# Patient Record
Sex: FEMALE | Race: WHITE | NOT HISPANIC OR LATINO | Employment: PART TIME | URBAN - METROPOLITAN AREA
[De-identification: names, ages, dates, MRNs, and addresses within clinical notes are randomized per-mention and may not be internally consistent; named-entity substitution may affect disease eponyms.]

---

## 2019-06-14 ENCOUNTER — APPOINTMENT (OUTPATIENT)
Dept: WOMENS IMAGING | Facility: HOSPITAL | Age: 54
End: 2019-06-14

## 2019-06-14 PROCEDURE — 77063 BREAST TOMOSYNTHESIS BI: CPT | Performed by: RADIOLOGY

## 2019-06-14 PROCEDURE — 77067 SCR MAMMO BI INCL CAD: CPT | Performed by: RADIOLOGY

## 2020-01-10 ENCOUNTER — APPOINTMENT (OUTPATIENT)
Dept: PREADMISSION TESTING | Facility: HOSPITAL | Age: 55
End: 2020-01-10

## 2020-01-10 VITALS
WEIGHT: 193 LBS | BODY MASS INDEX: 27.63 KG/M2 | OXYGEN SATURATION: 98 % | HEIGHT: 70 IN | RESPIRATION RATE: 16 BRPM | SYSTOLIC BLOOD PRESSURE: 115 MMHG | HEART RATE: 72 BPM | DIASTOLIC BLOOD PRESSURE: 77 MMHG | TEMPERATURE: 98.6 F

## 2020-01-10 LAB
ABO GROUP BLD: NORMAL
BASOPHILS # BLD AUTO: 0.06 10*3/MM3 (ref 0–0.2)
BASOPHILS NFR BLD AUTO: 0.6 % (ref 0–1.5)
BLD GP AB SCN SERPL QL: NEGATIVE
DEPRECATED RDW RBC AUTO: 40.5 FL (ref 37–54)
EOSINOPHIL # BLD AUTO: 0.19 10*3/MM3 (ref 0–0.4)
EOSINOPHIL NFR BLD AUTO: 2 % (ref 0.3–6.2)
ERYTHROCYTE [DISTWIDTH] IN BLOOD BY AUTOMATED COUNT: 12.7 % (ref 12.3–15.4)
HCT VFR BLD AUTO: 40.8 % (ref 34–46.6)
HGB BLD-MCNC: 13.5 G/DL (ref 12–15.9)
IMM GRANULOCYTES # BLD AUTO: 0.02 10*3/MM3 (ref 0–0.05)
IMM GRANULOCYTES NFR BLD AUTO: 0.2 % (ref 0–0.5)
LYMPHOCYTES # BLD AUTO: 2.59 10*3/MM3 (ref 0.7–3.1)
LYMPHOCYTES NFR BLD AUTO: 27 % (ref 19.6–45.3)
MCH RBC QN AUTO: 29.3 PG (ref 26.6–33)
MCHC RBC AUTO-ENTMCNC: 33.1 G/DL (ref 31.5–35.7)
MCV RBC AUTO: 88.5 FL (ref 79–97)
MONOCYTES # BLD AUTO: 0.65 10*3/MM3 (ref 0.1–0.9)
MONOCYTES NFR BLD AUTO: 6.8 % (ref 5–12)
NEUTROPHILS # BLD AUTO: 6.07 10*3/MM3 (ref 1.7–7)
NEUTROPHILS NFR BLD AUTO: 63.4 % (ref 42.7–76)
NRBC BLD AUTO-RTO: 0 /100 WBC (ref 0–0.2)
PLATELET # BLD AUTO: 279 10*3/MM3 (ref 140–450)
PMV BLD AUTO: 8.8 FL (ref 6–12)
RBC # BLD AUTO: 4.61 10*6/MM3 (ref 3.77–5.28)
RH BLD: POSITIVE
T&S EXPIRATION DATE: NORMAL
WBC NRBC COR # BLD: 9.58 10*3/MM3 (ref 3.4–10.8)

## 2020-01-10 PROCEDURE — 86901 BLOOD TYPING SEROLOGIC RH(D): CPT | Performed by: OBSTETRICS & GYNECOLOGY

## 2020-01-10 PROCEDURE — 86900 BLOOD TYPING SEROLOGIC ABO: CPT | Performed by: OBSTETRICS & GYNECOLOGY

## 2020-01-10 PROCEDURE — 36415 COLL VENOUS BLD VENIPUNCTURE: CPT

## 2020-01-10 PROCEDURE — 86850 RBC ANTIBODY SCREEN: CPT | Performed by: OBSTETRICS & GYNECOLOGY

## 2020-01-10 PROCEDURE — 85025 COMPLETE CBC W/AUTO DIFF WBC: CPT | Performed by: OBSTETRICS & GYNECOLOGY

## 2020-01-10 RX ORDER — CHOLECALCIFEROL (VITAMIN D3) 50 MCG
2000 TABLET ORAL DAILY
COMMUNITY

## 2020-01-10 RX ORDER — IVERMECTIN 10 MG/G
1 CREAM TOPICAL AS NEEDED
COMMUNITY
Start: 2019-12-31

## 2020-01-10 RX ORDER — MINOCYCLINE HYDROCHLORIDE 45 MG/1
1 TABLET, FILM COATED, EXTENDED RELEASE ORAL AS NEEDED
COMMUNITY
Start: 2019-12-02

## 2020-01-10 NOTE — DISCHARGE INSTRUCTIONS
Take the following medications the morning of surgery:  NONE      Arrive to hospital on your day of surgery at 5:15 AM.    General Instructions:  • Do not eat solid food after midnight the night before surgery.  • You may drink clear liquids day of surgery but must stop at least one hour before your hospital arrival time.  • It is beneficial for you to have a clear drink that contains carbohydrates the day of surgery.  We suggest a 12 to 20 ounce bottle of Gatorade or Powerade for non-diabetic patients or a 12 to 20 ounce bottle of G2 or Powerade Zero for diabetic patients. (Pediatric patients, are not advised to drink a 12 to 20 ounce carbohydrate drink)    Clear liquids are liquids you can see through.  Nothing red in color.     Plain water                               Sports drinks  Sodas                                   Gelatin (Jell-O)  Fruit juices without pulp such as white grape juice and apple juice  Popsicles that contain no fruit or yogurt  Tea or coffee (no cream or milk added)  Gatorade / Powerade  G2 / Powerade Zero    • Infants may have breast milk up to four hours before surgery.  • Infants drinking formula may drink formula up to six hours before surgery.   • Patients who avoid smoking, chewing tobacco and alcohol for 4 weeks prior to surgery have a reduced risk of post-operative complications.  Quit smoking as many days before surgery as you can.  • Do not smoke, use chewing tobacco or drink alcohol the day of surgery.   • If applicable bring your C-PAP/ BI-PAP machine.  • Bring any papers given to you in the doctor’s office.  • Wear clean comfortable clothes.  • Do not wear contact lenses, false eyelashes or make-up.  Bring a case for your glasses.   • Bring crutches or walker if applicable.  • Remove all piercings.  Leave jewelry and any other valuables at home.  • Hair extensions with metal clips must be removed prior to surgery.  • The Pre-Admission Testing nurse will instruct you to bring  medications if unable to obtain an accurate list in Pre-Admission Testing.        If you were given a blood bank ID arm band remember to bring it with you the day of surgery.    Preventing a Surgical Site Infection:  • For 2 to 3 days before surgery, avoid shaving with a razor because the razor can irritate skin and make it easier to develop an infection.    • Any areas of open skin can increase the risk of a post-operative wound infection by allowing bacteria to enter and travel throughout the body.  Notify your surgeon if you have any skin wounds / rashes even if it is not near the expected surgical site.  The area will need assessed to determine if surgery should be delayed until it is healed.  • The night prior to surgery sleep in a clean bed with clean clothing.  Do not allow pets to sleep with you.  • Shower on the morning of surgery using a fresh bar of anti-bacterial soap (such as Dial) and clean washcloth.  Dry with a clean towel and dress in clean clothing.  • Ask your surgeon if you will be receiving antibiotics prior to surgery.  • Make sure you, your family, and all healthcare providers clean their hands with soap and water or an alcohol based hand  before caring for you or your wound.    Day of surgery:  Your arrival time is approximately two hours before your scheduled surgery time.  Upon arrival, a Pre-op nurse and Anesthesiologist will review your health history, obtain vital signs, and answer questions you may have.  The only belongings needed at this time will be a list of your home medications and if applicable your C-PAP/BI-PAP machine.  If you are staying overnight your family can leave the rest of your belongings in the car and bring them to your room later.  A Pre-op nurse will start an IV and you may receive medication in preparation for surgery, including something to help you relax.  Your family will be able to see you in the Pre-op area.  Two visitors at a time will be allowed in  the Pre-op room.  While you are in surgery your family should notify the waiting room  if they leave the waiting room area and provide a contact phone number.    Please be aware that surgery does come with discomfort.  We want to make every effort to control your discomfort so please discuss any uncontrolled symptoms with your nurse.   Your doctor will most likely have prescribed pain medications.      If you are going home after surgery you will receive individualized written care instructions before being discharged.  A responsible adult must drive you to and from the hospital on the day of your surgery and stay with you for 24 hours.    If you are staying overnight following surgery, you will be transported to your hospital room following the recovery period.  [facility] has all private rooms.    If you have any questions please call Pre-Admission Testing at [phone].  Deductibles and co-payments are collected on the day of service. Please be prepared to pay the required co-pay, deductible or deposit on the day of service as defined by your plan.

## 2020-01-14 ENCOUNTER — ANESTHESIA EVENT (OUTPATIENT)
Dept: PERIOP | Facility: HOSPITAL | Age: 55
End: 2020-01-14

## 2020-01-14 ENCOUNTER — ANESTHESIA (OUTPATIENT)
Dept: PERIOP | Facility: HOSPITAL | Age: 55
End: 2020-01-14

## 2020-01-14 ENCOUNTER — HOSPITAL ENCOUNTER (OUTPATIENT)
Facility: HOSPITAL | Age: 55
Discharge: HOME | End: 2020-01-15
Attending: OBSTETRICS & GYNECOLOGY | Admitting: OBSTETRICS & GYNECOLOGY

## 2020-01-14 DIAGNOSIS — D25.9 UTERINE FIBROID: ICD-10-CM

## 2020-01-14 PROBLEM — D25.1 FIBROIDS, INTRAMURAL: Status: ACTIVE | Noted: 2020-01-14

## 2020-01-14 LAB
B-HCG UR QL: NEGATIVE
INTERNAL NEGATIVE CONTROL: NEGATIVE
INTERNAL POSITIVE CONTROL: POSITIVE
Lab: NORMAL

## 2020-01-14 PROCEDURE — 88305 TISSUE EXAM BY PATHOLOGIST: CPT | Performed by: OBSTETRICS & GYNECOLOGY

## 2020-01-14 PROCEDURE — 63710000001 SILVER NITRATE 75-25 % MISC: Performed by: OBSTETRICS & GYNECOLOGY

## 2020-01-14 PROCEDURE — 88307 TISSUE EXAM BY PATHOLOGIST: CPT | Performed by: OBSTETRICS & GYNECOLOGY

## 2020-01-14 PROCEDURE — 25010000002 ROPIVACAINE PER 1 MG: Performed by: OBSTETRICS & GYNECOLOGY

## 2020-01-14 PROCEDURE — 94799 UNLISTED PULMONARY SVC/PX: CPT

## 2020-01-14 PROCEDURE — A9270 NON-COVERED ITEM OR SERVICE: HCPCS | Performed by: OBSTETRICS & GYNECOLOGY

## 2020-01-14 PROCEDURE — 25010000003 CEFAZOLIN IN DEXTROSE 2-4 GM/100ML-% SOLUTION: Performed by: OBSTETRICS & GYNECOLOGY

## 2020-01-14 PROCEDURE — G0378 HOSPITAL OBSERVATION PER HR: HCPCS

## 2020-01-14 PROCEDURE — 25010000002 EPINEPHRINE PER 0.1 MG: Performed by: OBSTETRICS & GYNECOLOGY

## 2020-01-14 PROCEDURE — 25010000002 MIDAZOLAM PER 1 MG: Performed by: ANESTHESIOLOGY

## 2020-01-14 PROCEDURE — 25010000002 FENTANYL CITRATE (PF) 100 MCG/2ML SOLUTION: Performed by: NURSE ANESTHETIST, CERTIFIED REGISTERED

## 2020-01-14 PROCEDURE — 25010000002 PROPOFOL 10 MG/ML EMULSION: Performed by: NURSE ANESTHETIST, CERTIFIED REGISTERED

## 2020-01-14 PROCEDURE — 81025 URINE PREGNANCY TEST: CPT | Performed by: ANESTHESIOLOGY

## 2020-01-14 PROCEDURE — 25010000002 DEXAMETHASONE PER 1 MG: Performed by: NURSE ANESTHETIST, CERTIFIED REGISTERED

## 2020-01-14 PROCEDURE — 63710000001 OXYCODONE-ACETAMINOPHEN 7.5-325 MG TABLET: Performed by: OBSTETRICS & GYNECOLOGY

## 2020-01-14 PROCEDURE — 25010000002 ONDANSETRON PER 1 MG: Performed by: NURSE ANESTHETIST, CERTIFIED REGISTERED

## 2020-01-14 DEVICE — HORIZON TI ML 6 CLIPS/CART
Type: IMPLANTABLE DEVICE | Site: ABDOMEN | Status: FUNCTIONAL
Brand: WECK

## 2020-01-14 RX ORDER — HYDRALAZINE HYDROCHLORIDE 20 MG/ML
5 INJECTION INTRAMUSCULAR; INTRAVENOUS
Status: DISCONTINUED | OUTPATIENT
Start: 2020-01-14 | End: 2020-01-14 | Stop reason: HOSPADM

## 2020-01-14 RX ORDER — ZOLPIDEM TARTRATE 5 MG/1
5 TABLET ORAL NIGHTLY PRN
Status: DISCONTINUED | OUTPATIENT
Start: 2020-01-14 | End: 2020-01-15 | Stop reason: HOSPADM

## 2020-01-14 RX ORDER — FENTANYL CITRATE 50 UG/ML
50 INJECTION, SOLUTION INTRAMUSCULAR; INTRAVENOUS
Status: DISCONTINUED | OUTPATIENT
Start: 2020-01-14 | End: 2020-01-14 | Stop reason: HOSPADM

## 2020-01-14 RX ORDER — ROCURONIUM BROMIDE 10 MG/ML
INJECTION, SOLUTION INTRAVENOUS AS NEEDED
Status: DISCONTINUED | OUTPATIENT
Start: 2020-01-14 | End: 2020-01-14 | Stop reason: SURG

## 2020-01-14 RX ORDER — GABAPENTIN 300 MG/1
600 CAPSULE ORAL ONCE
Status: COMPLETED | OUTPATIENT
Start: 2020-01-14 | End: 2020-01-14

## 2020-01-14 RX ORDER — MIDAZOLAM HYDROCHLORIDE 1 MG/ML
2 INJECTION INTRAMUSCULAR; INTRAVENOUS
Status: DISCONTINUED | OUTPATIENT
Start: 2020-01-14 | End: 2020-01-14 | Stop reason: HOSPADM

## 2020-01-14 RX ORDER — PROMETHAZINE HYDROCHLORIDE 25 MG/1
25 TABLET ORAL ONCE AS NEEDED
Status: DISCONTINUED | OUTPATIENT
Start: 2020-01-14 | End: 2020-01-14 | Stop reason: HOSPADM

## 2020-01-14 RX ORDER — MIDAZOLAM HYDROCHLORIDE 1 MG/ML
1 INJECTION INTRAMUSCULAR; INTRAVENOUS
Status: DISCONTINUED | OUTPATIENT
Start: 2020-01-14 | End: 2020-01-14 | Stop reason: HOSPADM

## 2020-01-14 RX ORDER — DIPHENHYDRAMINE HCL 25 MG
25 CAPSULE ORAL
Status: DISCONTINUED | OUTPATIENT
Start: 2020-01-14 | End: 2020-01-14 | Stop reason: HOSPADM

## 2020-01-14 RX ORDER — MAGNESIUM HYDROXIDE 1200 MG/15ML
LIQUID ORAL AS NEEDED
Status: DISCONTINUED | OUTPATIENT
Start: 2020-01-14 | End: 2020-01-14 | Stop reason: HOSPADM

## 2020-01-14 RX ORDER — ACETAMINOPHEN 325 MG/1
650 TABLET ORAL ONCE AS NEEDED
Status: DISCONTINUED | OUTPATIENT
Start: 2020-01-14 | End: 2020-01-14 | Stop reason: HOSPADM

## 2020-01-14 RX ORDER — HYDROCODONE BITARTRATE AND ACETAMINOPHEN 7.5; 325 MG/1; MG/1
1 TABLET ORAL ONCE AS NEEDED
Status: DISCONTINUED | OUTPATIENT
Start: 2020-01-14 | End: 2020-01-14 | Stop reason: HOSPADM

## 2020-01-14 RX ORDER — ONDANSETRON 2 MG/ML
4 INJECTION INTRAMUSCULAR; INTRAVENOUS EVERY 6 HOURS PRN
Status: DISCONTINUED | OUTPATIENT
Start: 2020-01-14 | End: 2020-01-15 | Stop reason: HOSPADM

## 2020-01-14 RX ORDER — PROMETHAZINE HYDROCHLORIDE 25 MG/ML
6.25 INJECTION, SOLUTION INTRAMUSCULAR; INTRAVENOUS
Status: DISCONTINUED | OUTPATIENT
Start: 2020-01-14 | End: 2020-01-14 | Stop reason: HOSPADM

## 2020-01-14 RX ORDER — FAMOTIDINE 10 MG/ML
20 INJECTION, SOLUTION INTRAVENOUS ONCE
Status: COMPLETED | OUTPATIENT
Start: 2020-01-14 | End: 2020-01-14

## 2020-01-14 RX ORDER — SCOLOPAMINE TRANSDERMAL SYSTEM 1 MG/1
1 PATCH, EXTENDED RELEASE TRANSDERMAL CONTINUOUS
Status: DISPENSED | OUTPATIENT
Start: 2020-01-14 | End: 2020-01-15

## 2020-01-14 RX ORDER — HYDROMORPHONE HYDROCHLORIDE 1 MG/ML
0.5 INJECTION, SOLUTION INTRAMUSCULAR; INTRAVENOUS; SUBCUTANEOUS
Status: DISCONTINUED | OUTPATIENT
Start: 2020-01-14 | End: 2020-01-14 | Stop reason: HOSPADM

## 2020-01-14 RX ORDER — NALOXONE HCL 0.4 MG/ML
0.2 VIAL (ML) INJECTION AS NEEDED
Status: DISCONTINUED | OUTPATIENT
Start: 2020-01-14 | End: 2020-01-14 | Stop reason: HOSPADM

## 2020-01-14 RX ORDER — ACETAMINOPHEN 500 MG
1000 TABLET ORAL ONCE
Status: COMPLETED | OUTPATIENT
Start: 2020-01-14 | End: 2020-01-14

## 2020-01-14 RX ORDER — ONDANSETRON 2 MG/ML
INJECTION INTRAMUSCULAR; INTRAVENOUS AS NEEDED
Status: DISCONTINUED | OUTPATIENT
Start: 2020-01-14 | End: 2020-01-14 | Stop reason: SURG

## 2020-01-14 RX ORDER — OXYCODONE AND ACETAMINOPHEN 7.5; 325 MG/1; MG/1
2 TABLET ORAL EVERY 4 HOURS PRN
Status: DISCONTINUED | OUTPATIENT
Start: 2020-01-14 | End: 2020-01-15 | Stop reason: HOSPADM

## 2020-01-14 RX ORDER — KETAMINE HYDROCHLORIDE 10 MG/ML
INJECTION INTRAMUSCULAR; INTRAVENOUS AS NEEDED
Status: DISCONTINUED | OUTPATIENT
Start: 2020-01-14 | End: 2020-01-14 | Stop reason: SURG

## 2020-01-14 RX ORDER — SODIUM CHLORIDE 0.9 % (FLUSH) 0.9 %
3 SYRINGE (ML) INJECTION EVERY 12 HOURS SCHEDULED
Status: DISCONTINUED | OUTPATIENT
Start: 2020-01-14 | End: 2020-01-14 | Stop reason: HOSPADM

## 2020-01-14 RX ORDER — PROMETHAZINE HYDROCHLORIDE 25 MG/ML
12.5 INJECTION, SOLUTION INTRAMUSCULAR; INTRAVENOUS ONCE AS NEEDED
Status: DISCONTINUED | OUTPATIENT
Start: 2020-01-14 | End: 2020-01-14 | Stop reason: HOSPADM

## 2020-01-14 RX ORDER — PROMETHAZINE HYDROCHLORIDE 25 MG/1
25 SUPPOSITORY RECTAL ONCE AS NEEDED
Status: DISCONTINUED | OUTPATIENT
Start: 2020-01-14 | End: 2020-01-14 | Stop reason: HOSPADM

## 2020-01-14 RX ORDER — ONDANSETRON 4 MG/1
4 TABLET, FILM COATED ORAL EVERY 6 HOURS PRN
Status: DISCONTINUED | OUTPATIENT
Start: 2020-01-14 | End: 2020-01-15 | Stop reason: HOSPADM

## 2020-01-14 RX ORDER — FENTANYL CITRATE 50 UG/ML
INJECTION, SOLUTION INTRAMUSCULAR; INTRAVENOUS AS NEEDED
Status: DISCONTINUED | OUTPATIENT
Start: 2020-01-14 | End: 2020-01-14 | Stop reason: SURG

## 2020-01-14 RX ORDER — CEFAZOLIN SODIUM 2 G/100ML
2 INJECTION, SOLUTION INTRAVENOUS ONCE
Status: COMPLETED | OUTPATIENT
Start: 2020-01-14 | End: 2020-01-14

## 2020-01-14 RX ORDER — PROPOFOL 10 MG/ML
VIAL (ML) INTRAVENOUS AS NEEDED
Status: DISCONTINUED | OUTPATIENT
Start: 2020-01-14 | End: 2020-01-14 | Stop reason: SURG

## 2020-01-14 RX ORDER — DOCUSATE SODIUM 100 MG/1
100 CAPSULE, LIQUID FILLED ORAL 2 TIMES DAILY PRN
Status: DISCONTINUED | OUTPATIENT
Start: 2020-01-14 | End: 2020-01-15 | Stop reason: HOSPADM

## 2020-01-14 RX ORDER — FLUMAZENIL 0.1 MG/ML
0.2 INJECTION INTRAVENOUS AS NEEDED
Status: DISCONTINUED | OUTPATIENT
Start: 2020-01-14 | End: 2020-01-14 | Stop reason: HOSPADM

## 2020-01-14 RX ORDER — OXYCODONE AND ACETAMINOPHEN 7.5; 325 MG/1; MG/1
1 TABLET ORAL ONCE AS NEEDED
Status: DISCONTINUED | OUTPATIENT
Start: 2020-01-14 | End: 2020-01-14 | Stop reason: HOSPADM

## 2020-01-14 RX ORDER — ONDANSETRON 2 MG/ML
4 INJECTION INTRAMUSCULAR; INTRAVENOUS ONCE AS NEEDED
Status: DISCONTINUED | OUTPATIENT
Start: 2020-01-14 | End: 2020-01-14 | Stop reason: HOSPADM

## 2020-01-14 RX ORDER — NALOXONE HCL 0.4 MG/ML
0.1 VIAL (ML) INJECTION
Status: DISCONTINUED | OUTPATIENT
Start: 2020-01-14 | End: 2020-01-15 | Stop reason: HOSPADM

## 2020-01-14 RX ORDER — SODIUM CHLORIDE, SODIUM LACTATE, POTASSIUM CHLORIDE, CALCIUM CHLORIDE 600; 310; 30; 20 MG/100ML; MG/100ML; MG/100ML; MG/100ML
9 INJECTION, SOLUTION INTRAVENOUS CONTINUOUS
Status: DISCONTINUED | OUTPATIENT
Start: 2020-01-14 | End: 2020-01-15 | Stop reason: HOSPADM

## 2020-01-14 RX ORDER — SODIUM CHLORIDE 0.9 % (FLUSH) 0.9 %
3-10 SYRINGE (ML) INJECTION AS NEEDED
Status: DISCONTINUED | OUTPATIENT
Start: 2020-01-14 | End: 2020-01-14 | Stop reason: HOSPADM

## 2020-01-14 RX ORDER — LABETALOL HYDROCHLORIDE 5 MG/ML
5 INJECTION, SOLUTION INTRAVENOUS
Status: DISCONTINUED | OUTPATIENT
Start: 2020-01-14 | End: 2020-01-14 | Stop reason: HOSPADM

## 2020-01-14 RX ORDER — CEFAZOLIN SODIUM 2 G/100ML
2 INJECTION, SOLUTION INTRAVENOUS EVERY 8 HOURS
Status: COMPLETED | OUTPATIENT
Start: 2020-01-14 | End: 2020-01-14

## 2020-01-14 RX ORDER — DEXAMETHASONE SODIUM PHOSPHATE 10 MG/ML
INJECTION INTRAMUSCULAR; INTRAVENOUS AS NEEDED
Status: DISCONTINUED | OUTPATIENT
Start: 2020-01-14 | End: 2020-01-14 | Stop reason: SURG

## 2020-01-14 RX ORDER — EPHEDRINE SULFATE 50 MG/ML
5 INJECTION, SOLUTION INTRAVENOUS ONCE AS NEEDED
Status: DISCONTINUED | OUTPATIENT
Start: 2020-01-14 | End: 2020-01-14 | Stop reason: HOSPADM

## 2020-01-14 RX ORDER — DIPHENHYDRAMINE HYDROCHLORIDE 50 MG/ML
12.5 INJECTION INTRAMUSCULAR; INTRAVENOUS
Status: DISCONTINUED | OUTPATIENT
Start: 2020-01-14 | End: 2020-01-14 | Stop reason: HOSPADM

## 2020-01-14 RX ORDER — SODIUM CHLORIDE 9 MG/ML
INJECTION, SOLUTION INTRAVENOUS AS NEEDED
Status: DISCONTINUED | OUTPATIENT
Start: 2020-01-14 | End: 2020-01-14 | Stop reason: HOSPADM

## 2020-01-14 RX ORDER — LIDOCAINE HYDROCHLORIDE 20 MG/ML
INJECTION, SOLUTION INFILTRATION; PERINEURAL AS NEEDED
Status: DISCONTINUED | OUTPATIENT
Start: 2020-01-14 | End: 2020-01-14 | Stop reason: SURG

## 2020-01-14 RX ORDER — DEXTROSE AND SODIUM CHLORIDE 5; .45 G/100ML; G/100ML
125 INJECTION, SOLUTION INTRAVENOUS CONTINUOUS
Status: DISCONTINUED | OUTPATIENT
Start: 2020-01-14 | End: 2020-01-15 | Stop reason: HOSPADM

## 2020-01-14 RX ADMIN — CEFAZOLIN SODIUM 2 G: 2 INJECTION, SOLUTION INTRAVENOUS at 07:13

## 2020-01-14 RX ADMIN — KETAMINE HYDROCHLORIDE 25 MG: 10 INJECTION INTRAMUSCULAR; INTRAVENOUS at 07:21

## 2020-01-14 RX ADMIN — SODIUM CHLORIDE, POTASSIUM CHLORIDE, SODIUM LACTATE AND CALCIUM CHLORIDE 9 ML/HR: 600; 310; 30; 20 INJECTION, SOLUTION INTRAVENOUS at 05:57

## 2020-01-14 RX ADMIN — ONDANSETRON HYDROCHLORIDE 4 MG: 2 SOLUTION INTRAMUSCULAR; INTRAVENOUS at 09:12

## 2020-01-14 RX ADMIN — PROPOFOL 10 MG: 10 INJECTION, EMULSION INTRAVENOUS at 09:21

## 2020-01-14 RX ADMIN — PROPOFOL 10 MG: 10 INJECTION, EMULSION INTRAVENOUS at 09:28

## 2020-01-14 RX ADMIN — ROCURONIUM BROMIDE 40 MG: 10 INJECTION INTRAVENOUS at 07:08

## 2020-01-14 RX ADMIN — SUGAMMADEX 200 MG: 100 INJECTION, SOLUTION INTRAVENOUS at 09:34

## 2020-01-14 RX ADMIN — PROPOFOL 10 MG: 10 INJECTION, EMULSION INTRAVENOUS at 09:27

## 2020-01-14 RX ADMIN — PROPOFOL 10 MG: 10 INJECTION, EMULSION INTRAVENOUS at 09:23

## 2020-01-14 RX ADMIN — SCOPALAMINE 1 PATCH: 1 PATCH, EXTENDED RELEASE TRANSDERMAL at 06:36

## 2020-01-14 RX ADMIN — ACETAMINOPHEN 1000 MG: 500 TABLET, FILM COATED ORAL at 06:35

## 2020-01-14 RX ADMIN — DEXAMETHASONE SODIUM PHOSPHATE 4 MG: 10 INJECTION INTRAMUSCULAR; INTRAVENOUS at 07:13

## 2020-01-14 RX ADMIN — OXYCODONE HYDROCHLORIDE AND ACETAMINOPHEN 2 TABLET: 7.5; 325 TABLET ORAL at 13:10

## 2020-01-14 RX ADMIN — CEFAZOLIN SODIUM 2 G: 2 INJECTION, SOLUTION INTRAVENOUS at 14:42

## 2020-01-14 RX ADMIN — DEXTROSE AND SODIUM CHLORIDE 125 ML/HR: 5; 450 INJECTION, SOLUTION INTRAVENOUS at 19:05

## 2020-01-14 RX ADMIN — PROPOFOL 10 MG: 10 INJECTION, EMULSION INTRAVENOUS at 09:22

## 2020-01-14 RX ADMIN — FAMOTIDINE 20 MG: 10 INJECTION INTRAVENOUS at 06:36

## 2020-01-14 RX ADMIN — KETAMINE HYDROCHLORIDE 15 MG: 10 INJECTION INTRAMUSCULAR; INTRAVENOUS at 08:18

## 2020-01-14 RX ADMIN — GABAPENTIN 600 MG: 300 CAPSULE ORAL at 06:35

## 2020-01-14 RX ADMIN — PROPOFOL 10 MG: 10 INJECTION, EMULSION INTRAVENOUS at 09:25

## 2020-01-14 RX ADMIN — LIDOCAINE HYDROCHLORIDE 100 MG: 20 INJECTION, SOLUTION INFILTRATION; PERINEURAL at 07:07

## 2020-01-14 RX ADMIN — PROPOFOL 10 MG: 10 INJECTION, EMULSION INTRAVENOUS at 09:26

## 2020-01-14 RX ADMIN — PROPOFOL 160 MG: 10 INJECTION, EMULSION INTRAVENOUS at 07:07

## 2020-01-14 RX ADMIN — FENTANYL CITRATE 100 MCG: 50 INJECTION INTRAMUSCULAR; INTRAVENOUS at 07:07

## 2020-01-14 RX ADMIN — CEFAZOLIN SODIUM 2 G: 2 INJECTION, SOLUTION INTRAVENOUS at 22:37

## 2020-01-14 RX ADMIN — PROPOFOL 10 MG: 10 INJECTION, EMULSION INTRAVENOUS at 09:30

## 2020-01-14 RX ADMIN — OXYCODONE HYDROCHLORIDE AND ACETAMINOPHEN 2 TABLET: 7.5; 325 TABLET ORAL at 22:37

## 2020-01-14 RX ADMIN — PROPOFOL 10 MG: 10 INJECTION, EMULSION INTRAVENOUS at 09:20

## 2020-01-14 RX ADMIN — PROPOFOL 10 MG: 10 INJECTION, EMULSION INTRAVENOUS at 09:24

## 2020-01-14 RX ADMIN — KETAMINE HYDROCHLORIDE 10 MG: 10 INJECTION INTRAMUSCULAR; INTRAVENOUS at 08:56

## 2020-01-14 RX ADMIN — PROPOFOL 10 MG: 10 INJECTION, EMULSION INTRAVENOUS at 09:29

## 2020-01-14 RX ADMIN — MIDAZOLAM 1 MG: 1 INJECTION INTRAMUSCULAR; INTRAVENOUS at 06:36

## 2020-01-14 NOTE — ANESTHESIA PROCEDURE NOTES
Airway  Urgency: elective    Date/Time: 1/14/2020 7:09 AM  Airway not difficult    General Information and Staff    Patient location during procedure: OR  Anesthesiologist: Pieter Chacon MD  CRNA: Jennifer Garcia CRNA    Indications and Patient Condition  Indications for airway management: airway protection    Preoxygenated: yes  MILS maintained throughout  Mask difficulty assessment: 1 - vent by mask    Final Airway Details  Final airway type: endotracheal airway      Successful airway: ETT  Cuffed: yes   Successful intubation technique: direct laryngoscopy  Facilitating devices/methods: intubating stylet  Endotracheal tube insertion site: oral  Blade: Covarrubias  Blade size: 2  ETT size (mm): 7.0  Cormack-Lehane Classification: grade I - full view of glottis  Placement verified by: chest auscultation and capnometry   Measured from: lips  ETT/EBT  to lips (cm): 22  Number of attempts at approach: 1  Assessment: lips, teeth, and gum same as pre-op and atraumatic intubation

## 2020-01-14 NOTE — ANESTHESIA POSTPROCEDURE EVALUATION
"Patient: Sera Weeks    Procedure Summary     Date:  01/14/20 Room / Location:  [facility] OR [address] [place] [address] MAIN OR    Anesthesia Start:  0700 Anesthesia Stop:  0949    Procedure:  TOTAL LAPAROSCOPIC HYSTERECTOMY BILATERAL SALPINGECTOMY VULVAR LESION BIOPSY (N/A Abdomen) Diagnosis:      Surgeon:  Chiara Rod MD Provider:  Pieter Chacon MD    Anesthesia Type:  general ASA Status:  2          Anesthesia Type: general    Vitals  Vitals Value Taken Time   /72 1/14/2020 10:10 AM   Temp 36.7 °C (98 °F) 1/14/2020  9:48 AM   Pulse 69 1/14/2020 10:16 AM   Resp 14 1/14/2020 10:10 AM   SpO2 99 % 1/14/2020 10:16 AM   Vitals shown include unvalidated device data.        Post Anesthesia Care and Evaluation    Patient location during evaluation: bedside  Patient participation: complete - patient participated  Level of consciousness: awake and alert  Pain management: adequate  Airway patency: patent  Anesthetic complications: No anesthetic complications    Cardiovascular status: acceptable  Respiratory status: acceptable  Hydration status: acceptable    Comments: /72   Pulse 70   Temp 36.7 °C (98 °F) (Oral)   Resp 14   Ht 177.8 cm (70\")   Wt 85.1 kg (187 lb 9.6 oz)   LMP 12/25/2019 (Exact Date)   SpO2 97%   BMI 26.92 kg/m²           "

## 2020-01-14 NOTE — ANESTHESIA PREPROCEDURE EVALUATION
Anesthesia Evaluation     history of anesthetic complications (post-op hypotension after total hip): PONV               Airway   Mallampati: I  Neck ROM: full  no difficulty expected  Dental - normal exam     Pulmonary - negative pulmonary ROS and normal exam   (-) COPD, asthma, sleep apnea, not a smoker    PE comment: nonlabored  Cardiovascular - negative cardio ROS and normal exam    Rhythm: regular  Rate: normal    (-) hypertension, valvular problems/murmurs, past MI, CAD, dysrhythmias, angina      Neuro/Psych- negative ROS  (-) seizures, TIA, CVA  GI/Hepatic/Renal/Endo - negative ROS   (-) GERD, liver disease, no renal disease, diabetes    Musculoskeletal     (+) arthralgias,   Abdominal    Substance History      OB/GYN      Comment: Intramural leiomyoma of uterus      Other   arthritis, blood dyscrasia (borderline anemia),                     Anesthesia Plan    ASA 2     general     intravenous induction     Anesthetic plan, all risks, benefits, and alternatives have been provided, discussed and informed consent has been obtained with: patient.

## 2020-01-14 NOTE — BRIEF OP NOTE
Subjective     Date of Service:  01/14/20      Surgeon: Surgeon(s) and Role:      Chiara Rod MD - Primary   Assistant:  Angel Archer MD   Pre-operative diagnosis(es):  Enlarging intramural fibroids     Post-operative diagnosis(es):  Same as well as a pigmented vulvar lesion   Procedure(s): Procedure(s):  TOTAL LAPAROSCOPIC HYSTERECTOMY BILATERAL SALPINGECTOMY VULVAR LESION BIOPSY       Anesthesia: Type: General  ASA:  II     Objective      Operative findings:  Some pelvic, rectosigmoid and pelvic sidewall adhesions, multiple intramural fibroids, normal-appearing ovaries, corpus luteum cyst on the left ovary, 1 cm flat pigmented vulvar lesion   Specimens removed: ID Type Source Tests Collected by Time   A (Not marked as sent) : UTERUS, CERVIX, BILATERAL FALLOPIAN TUBES, FIBROIDS Tissue Uterus, Cervix, Bilateral Fallopian Tubes  TISSUE PATHOLOGY EXAM Chiara Rod MD 1/14/2020 0819   B (Not marked as sent) : VULVAR LESION BIOPSY Tissue Vulva TISSUE PATHOLOGY EXAM Chiara Rod MD 1/14/2020 0851          Complications:none    EBL: 200cc      Chiara Rod MD  01/14/20  9:34 AM

## 2020-01-14 NOTE — PLAN OF CARE
Problem: Patient Care Overview  Goal: Plan of Care Review  Outcome: Ongoing (interventions implemented as appropriate)  Flowsheets (Taken 1/14/2020 8056)  Plan of Care Reviewed With: patient  Note:   Vss, amb in sousa, murphy intact with good output, sean oral pain meds,

## 2020-01-14 NOTE — OP NOTE
Subjective     Date of Service:  01/14/20    Surgeon:  Assistant: MD Angel Craven MD         Pre-operative diagnosis(es):   Large intramural fibroids       Post-operative diagnosis(es): Post-Op Diagnosis Codes:  Same with flat pigmented vulvar lesion         Procedure(s): Procedure(s):  TOTAL LAPAROSCOPIC HYSTERECTOMY BILATERAL SALPINGECTOMY VULVAR LESION BIOPSY           Anesthesia: Type: General       Objective          Specimens removed: Specimens     ID Source Type Tests Collected By Collected At Frozen?      A Uterus, Cervix, Bilateral Fallopian Tubes  Tissue · TISSUE PATHOLOGY EXAM   Chiara Rod MD 1/14/20 0819 No     Description: UTERUS, CERVIX, BILATERAL FALLOPIAN TUBES, FIBROIDS    This specimen was not marked as sent.    B Vulva Tissue · TISSUE PATHOLOGY EXAM   Chiara Rod MD 1/14/20 0851      Description: VULVAR LESION BIOPSY    This specimen was not marked as sent.             EBL:  200 ml     Antibiotics:                cefoxitin (Mefoxin) ordered on call to OR               Complications:      Assessment/Plan  None       Operative Findings:  Multiple intramural fibroids, pelvic sidewall/rectosigmoid uterine adhesions, 1 cm flat pigmented vulvar lesion       Indication for surgery:  Enlarging symptomatic uterine fibroids and dysfunctional bleeding.  Patient estimated uterine volume greater than 700 cc desiring operative treatment       Description of Procedure:  After adequate anesthesia had been administered and examination revealed a globular uterus approximately 14 cm with multiple fibroids and no discernible adnexal masses.  A 1 cm flat pigmented nevus at the base of the right labia was noted.  After sterile prep and drape the cervix was grasped and a paracervical block was administered a total of 20 cc of half percent Naropin with dilute epinephrine in divided doses at the 9:00 respectively.  A Jamil catheter was placed.  A sponge stick was placed in the vaginal  vault.  Attention was turned to the umbilicus which was infiltrated with 5 cc of the local anesthesia.  Incision was made and the 5 mm trocar inserted without difficulty.  Correct placement was verified.  Secondary and tertiary puncture sites were positioned after first infiltrating the skin with local anesthesia.  Visualization was performed using transillumination as well as direct visualization to assure hemostasis of on and no injury on insertion.  The uterus was large and socked into the pelvis with 2 large posterior fibroids deep in the sacrum.  Multiple other fibroids encompassed the fundus.  Both fallopian tubes appeared normal, both ovaries appeared normal, there was a 2 cm left corpus luteum cyst that did not appear suspicious.  Dilute Pitressin solution was used to inject the surface of the intramural fibroids on the fundus.  The right fallopian tube was grasped and excised using the harmonic Ace.  The right utero-ovarian ligament and round ligament were then ligated using the harmonic Ace.  The broad ligament was diet dissected into anterior posterior views and the uterine arteries skeletonized and hemoclipped.  Attention was turned to the patient's left were once again the fallopian tube was excised and sent off for pathology after  an adhesion of the rectosigmoid to the left pelvic sidewall using the harmonic Ace.  The utero-ovarian ligament was enlarged as was the round ligament were taken down sequentially with the harmonic Ace.  Sequential Pitressin injections were carried out along this margin to assure hemostasis.  Eventually the uterine artery could be identified and was skeletonized and hemoclipped.  The visceral peritoneum was taken down in a low transverse fashion.  The perivesicular fascia was dissected off the  lower uterine segment, and using a sponge stick in the vaginal vault a anterior colpotomy was made.  The cul-de-sac could not be visualized due to the size of the large  fibroids involving the sacral hollow however dissection of the cardinal ligaments was carried out using the harmonic base with care to stay medial to the uterine artery pedicle.  The uterosacral ligaments were transected and the cervix was dissected free.  Adhesio lysis was required in the patient's deep left pelvis due to the multiple fibroids on this side.  Attention was then turned vaginally where vaginal morcellation was carried out using retractors and a scalpel with care to avoid pelvic or abdominal contamination.  Once the specimen was free it was weighed and weighed approximately 530 g.  Note had been made of the 1 cm pigmented vulvar lesion at the base of the patient's right labia majora.  This was biopsied approximately 1 mm and the base treated with silver nitrate sticks.  The abdomen was reinsufflated and hemostasis was achieved.  The vaginal cuff was closed with figure-of-eight sutures of 0 Vicryl.  Relaxation of the pneumoperitoneum suggested excellent hemostasis.  Both ovaries appeared normal.  The appendix was visualized and was not acutely inflamed.  The surface of the intestines appeared normal.  The liver edge was not well visualized.  They were no upper abdominal adhesions noted.  Once again hemostasis was assured with relaxation of the pneumoperitoneum and the pneumoperitoneum was allowed to escape and facilitated with hyperinflation of the lungs.  The trochars have been removed and the incisions repaired with 4-0 Monocryl.  Cystoscopy was carried out and revealed urine effluxing readily from both ureteral orifices.  There were some tiny punctate lesions suggestive but not diagnostic of interstitial cystitis noted.  No evidence of cystotomy.  The Jamil catheter was replaced.  Instrument and sponge count were correct and the patient was left in the operating room in stable condition.                     Chiara Rod MD  01/14/20  9:37 AM

## 2020-01-15 VITALS
BODY MASS INDEX: 26.86 KG/M2 | RESPIRATION RATE: 16 BRPM | WEIGHT: 187.6 LBS | HEART RATE: 65 BPM | HEIGHT: 70 IN | SYSTOLIC BLOOD PRESSURE: 97 MMHG | OXYGEN SATURATION: 98 % | TEMPERATURE: 98.4 F | DIASTOLIC BLOOD PRESSURE: 57 MMHG

## 2020-01-15 PROBLEM — D25.1 FIBROIDS, INTRAMURAL: Status: RESOLVED | Noted: 2020-01-14 | Resolved: 2020-01-15

## 2020-01-15 LAB
DEPRECATED RDW RBC AUTO: 38.1 FL (ref 37–54)
ERYTHROCYTE [DISTWIDTH] IN BLOOD BY AUTOMATED COUNT: 12.4 % (ref 12.3–15.4)
HCT VFR BLD AUTO: 35.7 % (ref 34–46.6)
HGB BLD-MCNC: 12.2 G/DL (ref 12–15.9)
MCH RBC QN AUTO: 29 PG (ref 26.6–33)
MCHC RBC AUTO-ENTMCNC: 34.2 G/DL (ref 31.5–35.7)
MCV RBC AUTO: 85 FL (ref 79–97)
PLATELET # BLD AUTO: 224 10*3/MM3 (ref 140–450)
PMV BLD AUTO: 8.6 FL (ref 6–12)
RBC # BLD AUTO: 4.2 10*6/MM3 (ref 3.77–5.28)
WBC NRBC COR # BLD: 10.79 10*3/MM3 (ref 3.4–10.8)

## 2020-01-15 PROCEDURE — G0378 HOSPITAL OBSERVATION PER HR: HCPCS

## 2020-01-15 PROCEDURE — 63710000001 OXYCODONE-ACETAMINOPHEN 7.5-325 MG TABLET: Performed by: OBSTETRICS & GYNECOLOGY

## 2020-01-15 PROCEDURE — 85027 COMPLETE CBC AUTOMATED: CPT | Performed by: OBSTETRICS & GYNECOLOGY

## 2020-01-15 PROCEDURE — A9270 NON-COVERED ITEM OR SERVICE: HCPCS | Performed by: OBSTETRICS & GYNECOLOGY

## 2020-01-15 RX ADMIN — OXYCODONE HYDROCHLORIDE AND ACETAMINOPHEN 2 TABLET: 7.5; 325 TABLET ORAL at 11:35

## 2020-01-15 RX ADMIN — DEXTROSE AND SODIUM CHLORIDE 125 ML/HR: 5; 450 INJECTION, SOLUTION INTRAVENOUS at 02:48

## 2020-01-15 NOTE — PROGRESS NOTES
[facility]  POST OP  PROGRESS NOTE    POD1   Subjective     Patient reports:    Pain is controlled.       Objective       Vitals: Vital Signs Range for the last 24 hours  Temperature: Temp:  [97 °F (36.1 °C)-98.4 °F (36.9 °C)] 98.4 °F (36.9 °C)       BP: BP: ()/(57-74) 97/57   Pulse: Heart Rate:  [59-74] 65   Respirations: Resp:  [16] 16         Intake/Output Summary (Last 24 hours) at 1/15/2020 1044  Last data filed at 1/15/2020 1034  Gross per 24 hour   Intake 3586.87 ml   Output 3900 ml   Net -313.13 ml                                             Physical Exam     General  Alert in NAD    Abdomen Soft, non-distended, appropriately tender    Incision  Clean, dry and intact     Extremities Calves NT bilaterally          Lab results reviewed:  CBC:   Lab Results   Component Value Date    WBC 10.79 01/15/2020    HGB 12.2 01/15/2020    HCT 35.7 01/15/2020          Assessment/Plan     POD 1 - Doing well. Hemodynamically stable. Intraoperative    findings reviewed with the patient.       Plan:  Stable for discharge. Post op instruction discussed. Follow up in 2 weeks.          Fibroids, intramural                   Chiara Rod MD  1/15/2020  10:44 AM

## 2020-01-15 NOTE — PROGRESS NOTES
Discharge Planning Assessment  [facility]     Patient Name: Sera Weeks  MRN: [id number]  Today's Date: 1/15/2020    Admit Date: 1/14/2020      Discharge Plan     Row Name 01/15/20 1118       Plan    Plan Comments  Discharge order, no discharge needs identified.    Final Discharge Disposition Code  01 - home or self-care     Expected Discharge Date and Time     Expected Discharge Date Expected Discharge Time    Fady 15, 2020      Trish Rose RN

## 2020-01-15 NOTE — PLAN OF CARE
Problem: Patient Care Overview  Goal: Plan of Care Review  Outcome: Ongoing (interventions implemented as appropriate)  Flowsheets (Taken 1/15/2020 0403)  Progress: improving  Plan of Care Reviewed With: patient  Outcome Summary: VSS. Medicated with Percocet for incisional pain. Scant vaginal bleeding present. Up ad albert, ambulated multiple times throughout the night. IVF and IV abx continued. FC to BSD with clear yellow urine. Lap sites CDI. Resting well now, will continue to monitor

## 2020-01-17 LAB
LAB AP CASE REPORT: NORMAL
PATH REPORT.FINAL DX SPEC: NORMAL
PATH REPORT.GROSS SPEC: NORMAL

## 2021-03-30 ENCOUNTER — BULK ORDERING (OUTPATIENT)
Dept: CASE MANAGEMENT | Facility: OTHER | Age: 56
End: 2021-03-30

## 2021-03-30 DIAGNOSIS — Z23 IMMUNIZATION DUE: ICD-10-CM

## 2024-05-06 ENCOUNTER — LAB (OUTPATIENT)
Dept: LAB | Facility: HOSPITAL | Age: 59
End: 2024-05-06
Payer: OTHER GOVERNMENT

## 2024-05-06 ENCOUNTER — OFFICE VISIT (OUTPATIENT)
Dept: FAMILY MEDICINE CLINIC | Age: 59
End: 2024-05-06
Payer: OTHER GOVERNMENT

## 2024-05-06 VITALS
WEIGHT: 190.6 LBS | HEART RATE: 68 BPM | TEMPERATURE: 98.2 F | SYSTOLIC BLOOD PRESSURE: 118 MMHG | OXYGEN SATURATION: 99 % | HEIGHT: 70 IN | DIASTOLIC BLOOD PRESSURE: 84 MMHG | BODY MASS INDEX: 27.29 KG/M2

## 2024-05-06 DIAGNOSIS — Z86.2 HISTORY OF ANEMIA: ICD-10-CM

## 2024-05-06 DIAGNOSIS — Z83.49 FAMILY HISTORY OF THYROID DISORDER: ICD-10-CM

## 2024-05-06 DIAGNOSIS — Z83.49 FAMILY HISTORY OF HEMOCHROMATOSIS: ICD-10-CM

## 2024-05-06 DIAGNOSIS — Z00.00 ROUTINE GENERAL MEDICAL EXAMINATION AT A HEALTH CARE FACILITY: Primary | ICD-10-CM

## 2024-05-06 DIAGNOSIS — Z83.3 FAMILY HISTORY OF DIABETES MELLITUS: ICD-10-CM

## 2024-05-06 DIAGNOSIS — Z13.6 SCREENING FOR CARDIOVASCULAR CONDITION: ICD-10-CM

## 2024-05-06 LAB
DEPRECATED RDW RBC AUTO: 42.2 FL (ref 37–54)
ERYTHROCYTE [DISTWIDTH] IN BLOOD BY AUTOMATED COUNT: 12.8 % (ref 12.3–15.4)
HBA1C MFR BLD: 5.4 % (ref 4.8–5.6)
HCT VFR BLD AUTO: 42.7 % (ref 34–46.6)
HGB BLD-MCNC: 13.6 G/DL (ref 12–15.9)
MCH RBC QN AUTO: 28.2 PG (ref 26.6–33)
MCHC RBC AUTO-ENTMCNC: 31.9 G/DL (ref 31.5–35.7)
MCV RBC AUTO: 88.4 FL (ref 79–97)
PLATELET # BLD AUTO: 284 10*3/MM3 (ref 140–450)
PMV BLD AUTO: 9 FL (ref 6–12)
RBC # BLD AUTO: 4.83 10*6/MM3 (ref 3.77–5.28)
WBC NRBC COR # BLD AUTO: 6.02 10*3/MM3 (ref 3.4–10.8)

## 2024-05-06 PROCEDURE — 85027 COMPLETE CBC AUTOMATED: CPT

## 2024-05-06 PROCEDURE — 36415 COLL VENOUS BLD VENIPUNCTURE: CPT

## 2024-05-06 PROCEDURE — 80053 COMPREHEN METABOLIC PANEL: CPT

## 2024-05-06 PROCEDURE — 83540 ASSAY OF IRON: CPT

## 2024-05-06 PROCEDURE — 82728 ASSAY OF FERRITIN: CPT

## 2024-05-06 PROCEDURE — 84436 ASSAY OF TOTAL THYROXINE: CPT

## 2024-05-06 PROCEDURE — 84443 ASSAY THYROID STIM HORMONE: CPT

## 2024-05-06 PROCEDURE — 83036 HEMOGLOBIN GLYCOSYLATED A1C: CPT

## 2024-05-06 PROCEDURE — 80061 LIPID PANEL: CPT

## 2024-05-06 PROCEDURE — 84479 ASSAY OF THYROID (T3 OR T4): CPT

## 2024-05-06 PROCEDURE — 84466 ASSAY OF TRANSFERRIN: CPT

## 2024-05-07 LAB
ALBUMIN SERPL-MCNC: 4.3 G/DL (ref 3.5–5.2)
ALBUMIN/GLOB SERPL: 1.5 G/DL
ALP SERPL-CCNC: 95 U/L (ref 39–117)
ALT SERPL W P-5'-P-CCNC: 23 U/L (ref 1–33)
ANION GAP SERPL CALCULATED.3IONS-SCNC: 9.3 MMOL/L (ref 5–15)
AST SERPL-CCNC: 23 U/L (ref 1–32)
BILIRUB SERPL-MCNC: 0.5 MG/DL (ref 0–1.2)
BUN SERPL-MCNC: 13 MG/DL (ref 6–20)
BUN/CREAT SERPL: 15.1 (ref 7–25)
CALCIUM SPEC-SCNC: 9.4 MG/DL (ref 8.6–10.5)
CHLORIDE SERPL-SCNC: 103 MMOL/L (ref 98–107)
CHOLEST SERPL-MCNC: 215 MG/DL (ref 0–200)
CO2 SERPL-SCNC: 26.7 MMOL/L (ref 22–29)
CREAT SERPL-MCNC: 0.86 MG/DL (ref 0.57–1)
EGFRCR SERPLBLD CKD-EPI 2021: 79.4 ML/MIN/1.73
FERRITIN SERPL-MCNC: 158 NG/ML (ref 13–150)
GLOBULIN UR ELPH-MCNC: 2.8 GM/DL
GLUCOSE SERPL-MCNC: 98 MG/DL (ref 65–99)
HDLC SERPL-MCNC: 71 MG/DL (ref 40–60)
IRON 24H UR-MRATE: 82 MCG/DL (ref 37–145)
IRON SATN MFR SERPL: 23 % (ref 20–50)
LDLC SERPL CALC-MCNC: 125 MG/DL (ref 0–100)
LDLC/HDLC SERPL: 1.72 {RATIO}
POTASSIUM SERPL-SCNC: 4.4 MMOL/L (ref 3.5–5.2)
PROT SERPL-MCNC: 7.1 G/DL (ref 6–8.5)
SODIUM SERPL-SCNC: 139 MMOL/L (ref 136–145)
T-UPTAKE NFR SERPL: 1.13 TBI (ref 0.8–1.3)
T4 SERPL-MCNC: 7.37 MCG/DL (ref 4.5–11.7)
TIBC SERPL-MCNC: 352 MCG/DL (ref 298–536)
TRANSFERRIN SERPL-MCNC: 236 MG/DL (ref 200–360)
TRIGL SERPL-MCNC: 109 MG/DL (ref 0–150)
TSH SERPL DL<=0.05 MIU/L-ACNC: 2.68 UIU/ML (ref 0.27–4.2)
VLDLC SERPL-MCNC: 19 MG/DL (ref 5–40)

## 2024-05-14 DIAGNOSIS — Z12.11 SCREENING FOR COLON CANCER: Primary | ICD-10-CM

## 2024-07-01 ENCOUNTER — OFFICE VISIT (OUTPATIENT)
Dept: FAMILY MEDICINE CLINIC | Age: 59
End: 2024-07-01
Payer: OTHER GOVERNMENT

## 2024-07-01 ENCOUNTER — HOSPITAL ENCOUNTER (OUTPATIENT)
Dept: GENERAL RADIOLOGY | Facility: HOSPITAL | Age: 59
Discharge: HOME | End: 2024-07-01
Payer: OTHER GOVERNMENT

## 2024-07-01 VITALS
BODY MASS INDEX: 27.35 KG/M2 | HEART RATE: 71 BPM | SYSTOLIC BLOOD PRESSURE: 122 MMHG | WEIGHT: 191 LBS | TEMPERATURE: 97.5 F | OXYGEN SATURATION: 98 % | HEIGHT: 70 IN | DIASTOLIC BLOOD PRESSURE: 81 MMHG

## 2024-07-01 DIAGNOSIS — M25.521 RIGHT ELBOW PAIN: Primary | ICD-10-CM

## 2024-07-01 PROCEDURE — 73070 X-RAY EXAM OF ELBOW: CPT

## 2024-07-01 PROCEDURE — 99213 OFFICE O/P EST LOW 20 MIN: CPT

## 2024-07-01 NOTE — PROGRESS NOTES
Subjective     CHIEF COMPLAINT    Chief Complaint   Patient presents with    Elbow Injury     Right   - Patient was doing yoga and rolled over to get up on right side and was leaning on her left side and when she went to get up she santos a crack      History of Present Illness  Patient is a 59-year-old female, presenting to the clinic today with complaints of right elbow pain.  She states on Friday she was doing some stretches on her yoga mat.  She rolled over onto her right elbow and pushed up with her left hand.  She felt like she heard a pop.  She has been having intermittent right elbow pain since then.  She states it hurts with certain movements and with pressure on it.  She did not notice any bruising or redness.  She has never had any surgeries to the right elbow.  She has not tried any Tylenol or ibuprofen for the pain.  Pain is described as sharp when it does occur.    Review of Systems   Constitutional:  Negative for chills and fever.   Musculoskeletal:  Positive for arthralgias.   Skin:  Negative for color change, rash and wound.     Past Medical History:   Diagnosis Date    Abdominal pain     Anesthesia complication     HYPOTENSION    Arthritis     Borderline anemia     Intramural leiomyoma of uterus     PONV (postoperative nausea and vomiting)     Rosacea      Past Surgical History:   Procedure Laterality Date    BREAST BIOPSY Left      SECTION      X1    D & C HYSTEROSCOPY ENDOMETRIAL ABLATION N/A 10/18/2016    Procedure: DILATATION AND CURETTAGE HYSTEROSCOPY NOVASURE ENDOMETRIAL ABLATION, cervical polypectomy;  Surgeon: Chiara Rod MD;  Location: [facility];  Service:     JOINT REPLACEMENT  2016    TOTAL HIP ARTHROPLASTY Right 2016    Procedure: RT TOTAL HIP ARTHROPLASTY;  Surgeon: Gilmar Nunn MD;  Location: [facility] OR;  Service:     TOTAL LAPAROSCOPIC HYSTERECTOMY N/A 2020    Procedure: TOTAL LAPAROSCOPIC HYSTERECTOMY BILATERAL SALPINGECTOMY VULVAR LESION  "BIOPSY;  Surgeon: Chiara Rod MD;  Location: [facility] OR;  Service: Gynecology    WISDOM TOOTH EXTRACTION       Family History   Problem Relation Age of Onset    Diabetes Mother     Hemochromatosis Mother     Hypothyroidism Mother     Arthritis Mother     Cancer Mother         liver (cirrhoisis)    Thyroid disease Mother     Other Mother         Hemochromatosis    Kidney cancer Father     Cancer Father     Hyperlipidemia Father     Kidney disease Father     Hypothyroidism Sister     Thyroid disease Sister     Arthritis Maternal Grandmother     Colon cancer Maternal Grandfather     Cancer Maternal Grandfather     Thyroid disease Maternal Aunt     Thyroid disease Maternal Uncle     Cancer Paternal Aunt     Malig Hyperthermia Neg Hx      Social History     Socioeconomic History    Marital status:     Number of children: 1   Tobacco Use    Smoking status: Never    Smokeless tobacco: Never   Vaping Use    Vaping status: Never Used   Substance and Sexual Activity    Alcohol use: Yes     Comment: Some weeks zero.  Some weeks 1-2    Drug use: No    Sexual activity: Yes     Partners: Male     Birth control/protection: Hysterectomy     Comment: . One partner.     Allergies   Allergen Reactions    Mobic [Meloxicam] Nausea And Vomiting    Other      VIVOMAX- MADE PATIENT FELL VERY WEIRD    Sulfa Antibiotics Nausea And Vomiting    Vimovo [Naproxen-Esomeprazole Mg] Nausea Only and Other (See Comments)     \"JUST DIDN'T FEEL WELL ALL THE WAY AROUND\"    Terramycin [Oxytetracycline] Rash     Current Outpatient Medications on File Prior to Visit   Medication Sig Dispense Refill    Cholecalciferol (VITAMIN D) 50 MCG (2000 UT) tablet Take 1 tablet by mouth Daily.      ELDERBERRY PO Take  by mouth.      EQL LUTEIN PO Take  by mouth.      Misc Natural Products (OSTEO BI-FLEX ADV DOUBLE ST PO) Take  by mouth.      Multiple Vitamins-Minerals (OCUVITE PO) Take 1 tablet by mouth Every Other Day. HOLD PRIOR TO SURG      " "NON FORMULARY Algazim      SOOLANTRA 1 % cream Apply 1 application topically to the appropriate area as directed As Needed.       No current facility-administered medications on file prior to visit.     /81 (BP Location: Left arm, Patient Position: Sitting)   Pulse 71   Temp 97.5 °F (36.4 °C) (Temporal)   Ht 177.8 cm (70\")   Wt 86.6 kg (191 lb)   SpO2 98%   BMI 27.41 kg/m²     Objective     Physical Exam  Constitutional:       General: She is not in acute distress.     Appearance: Normal appearance. She is not ill-appearing.   HENT:      Head: Normocephalic.   Cardiovascular:      Pulses:           Radial pulses are 2+ on the right side.   Pulmonary:      Effort: Pulmonary effort is normal. No respiratory distress.   Musculoskeletal:      Right elbow: No swelling, deformity or effusion. Normal range of motion. Tenderness present in lateral epicondyle.   Skin:     General: Skin is warm and dry.   Neurological:      General: No focal deficit present.      Mental Status: She is alert and oriented to person, place, and time.   Psychiatric:         Mood and Affect: Mood and affect normal.         Speech: Speech normal.       XR Elbow 2 View Right (In Office)    Result Date: 7/1/2024  XR ELBOW 2 VW RIGHT Date of Exam: 7/1/2024 9:56 AM EDT Indication: right elbow pain since friday, rolled and heard a pop Comparison: None available. Findings: No evidence of acute fracture nor dislocation. Alignment is normal. Joint space is adequately maintained. Soft tissues are unremarkable.     Impression: No acute osseous abnormality of the right elbow. Electronically Signed: Renetta Huggins MD  7/1/2024 10:22 AM EDT  Workstation ID: [id number]           Assessment & Plan  Right elbow pain  Possible lateral epicondylitis however patient does note a popping sensation recently.  X-ray shows no acute osseous abnormality.  Referral sent to Ortho for further evaluation.  Recommend ice, rest, over-the-counter NSAIDs.  Return and ER " precautions advised.    Orders Placed This Encounter   Procedures    XR Elbow 2 View Right (In Office)    Ambulatory Referral to Orthopedic Surgery            Follow up:  Return if symptoms worsen or fail to improve.  Patient was given instructions and counseling regarding her condition or for health maintenance advice. Please see specific information pulled into the AVS if appropriate.

## 2024-07-03 ENCOUNTER — TRANSCRIBE ORDERS (OUTPATIENT)
Dept: ADMINISTRATIVE | Facility: HOSPITAL | Age: 59
End: 2024-07-03
Payer: OTHER GOVERNMENT

## 2024-07-03 DIAGNOSIS — M25.521 RIGHT ELBOW PAIN: Primary | ICD-10-CM

## 2024-07-16 ENCOUNTER — HOSPITAL ENCOUNTER (OUTPATIENT)
Dept: MRI IMAGING | Facility: HOSPITAL | Age: 59
Discharge: HOME | End: 2024-07-16
Payer: OTHER GOVERNMENT

## 2024-07-16 DIAGNOSIS — M25.521 RIGHT ELBOW PAIN: ICD-10-CM

## 2025-04-07 ENCOUNTER — OFFICE VISIT (OUTPATIENT)
Dept: FAMILY MEDICINE CLINIC | Age: 60
End: 2025-04-07
Payer: OTHER GOVERNMENT

## 2025-04-07 ENCOUNTER — TELEPHONE (OUTPATIENT)
Dept: FAMILY MEDICINE CLINIC | Age: 60
End: 2025-04-07
Payer: OTHER GOVERNMENT

## 2025-04-07 VITALS
HEART RATE: 101 BPM | OXYGEN SATURATION: 96 % | TEMPERATURE: 98.5 F | HEIGHT: 70 IN | BODY MASS INDEX: 28.06 KG/M2 | WEIGHT: 196 LBS | SYSTOLIC BLOOD PRESSURE: 110 MMHG | DIASTOLIC BLOOD PRESSURE: 77 MMHG

## 2025-04-07 DIAGNOSIS — Z09 HOSPITAL DISCHARGE FOLLOW-UP: Primary | ICD-10-CM

## 2025-04-07 DIAGNOSIS — R74.01 TRANSAMINITIS: ICD-10-CM

## 2025-04-07 DIAGNOSIS — Z90.49 HISTORY OF LAPAROSCOPIC CHOLECYSTECTOMY: ICD-10-CM

## 2025-04-07 DIAGNOSIS — K85.10 ACUTE BILIARY PANCREATITIS, UNSPECIFIED COMPLICATION STATUS: ICD-10-CM

## 2025-04-07 DIAGNOSIS — Z98.890 S/P ERCP: ICD-10-CM

## 2025-04-07 RX ORDER — OXYCODONE AND ACETAMINOPHEN 10; 325 MG/1; MG/1
1 TABLET ORAL EVERY 6 HOURS PRN
COMMUNITY
Start: 2025-04-06

## 2025-04-07 NOTE — PROGRESS NOTES
Transitional Care Follow Up Visit  Subjective     Sera Weeks is a 59 y.o. female who presents for a transitional care management visit.    Patient was admitted to [facility] follow up Date of admission: 4/2/25- 4/6/25 Dx: Pancreatitis Procedures: gallbladder removal.      I reviewed and discussed the details of that call along with the discharge summary, hospital problems, inpatient lab results, inpatient diagnostic studies, and consultation reports with Sera.     Current outpatient and discharge medications have been reconciled for the patient.  Reviewed by: BEAU Alatorre           No data to display              Risk for Readmission (LACE) No data recorded    History of Present Illness   Course During Hospital Stay: Patient was admitted to [facility]2/25.  She was discharged yesterday. She presented to the emergency room with 2 day hx of epigastric abdominal pain that radiated to her back.  She was found to have acute gallstone pancreatitis and cholecystitis and choledocholithiasis, status post biliary sphincterectomy and balloon extraction, ERCP.  Her lipase on admission was 10,305.  AST 1733 ALT 1651 alk phos 308 T. bili 4.1.  CBC normal.  She was also treated with IV Zosyn.  She underwent lap cholecystectomy April 5.  No history of alcoholism or elevated triglycerides.  Had 15 round drain placed during floyd and connected to bulb suction.  Patient presently has THAIS drain.  She was told that this needs to be removed this Thursday     Since patient has been home, she is continuing a clear liquid diet.  She has been free of nausea and vomiting.  Does endorse generalized abdominal soreness at surgical sites.  No chest pain or shortness of air, no calf pain.  Pain is controlled with Percocet that she is using every 12 hours.  Is passing gas but has not had a bowel movement.    Her  is present with her for visit.  Hospital discharge summary and operative report, labs  "is available and was reviewed through [facility] everywhere link.    Objective   /77 (BP Location: Right arm, Patient Position: Sitting, Cuff Size: Adult)   Pulse 101   Temp 98.5 °F (36.9 °C) (Oral)   Ht 177.8 cm (70\")   Wt 88.9 kg (196 lb)   SpO2 96%   BMI 28.12 kg/m²   Physical Exam  Vitals reviewed.   Constitutional:       General: She is not in acute distress.     Appearance: Normal appearance. She is not ill-appearing.   HENT:      Head: Normocephalic and atraumatic.      Nose: Nose normal.      Mouth/Throat:      Mouth: Mucous membranes are moist.      Pharynx: Oropharynx is clear.   Cardiovascular:      Rate and Rhythm: Normal rate and regular rhythm.      Pulses: Normal pulses.      Heart sounds: Normal heart sounds.   Pulmonary:      Effort: Pulmonary effort is normal.      Breath sounds: Normal breath sounds.   Abdominal:      Comments: Lap sites present to abdomen with Dermabond present, minimal ecchymosis, no drainage or erythema.  THAIS drain noted to right abdomen, sanguinous drainage in THAIS bulb noted   Musculoskeletal:      Cervical back: Neck supple.   Skin:     General: Skin is warm and dry.   Neurological:      General: No focal deficit present.      Mental Status: She is alert and oriented to person, place, and time. Mental status is at baseline.   Psychiatric:         Mood and Affect: Mood normal.         Behavior: Behavior normal.         Judgment: Judgment normal.         Assessment & Plan   Problems Addressed this Visit    None  Visit Diagnoses         Hospital discharge follow-up    -  Primary      Transaminitis        Relevant Orders    Comprehensive metabolic panel    Ambulatory Referral to General Surgery      Acute biliary pancreatitis, unspecified complication status        Relevant Orders    Lipase    CBC w AUTO Differential    Ambulatory Referral to General Surgery      History of laparoscopic cholecystectomy        Relevant Orders    Ambulatory Referral to General Surgery      " S/P ERCP        Relevant Orders    Ambulatory Referral to General Surgery          Diagnoses         Codes Comments      Hospital discharge follow-up    -  Primary ICD-10-CM: Z09  ICD-9-CM: V67.59       Transaminitis     ICD-10-CM: R74.01  ICD-9-CM: 790.4       Acute biliary pancreatitis, unspecified complication status     ICD-10-CM: K85.10  ICD-9-CM: 577.0       History of laparoscopic cholecystectomy     ICD-10-CM: Z90.49  ICD-9-CM: V45.89       S/P ERCP     ICD-10-CM: Z98.890  ICD-9-CM: V45.89           Patient is stable posthospital discharge.  She does have a remaining Pablo-Escobar drain that will need to be removed on Thursday.  Explained to patient that we can refer to general surgery office for follow-up care and hopefully they will be able to remove the drain for her.  If not, she will need to see surgeon I completed operation in [place].  Referral was placed.    Pain is well-controlled and she does not have any signs or symptoms of postop complications at this time.  Do recommend starting stool softener over-the-counter since she has not have bowel movement and is on Percocet.  Continue liquid diet with advancement as tolerated.  Educated on signs and symptoms of infection.  Surgical sites appear normal today.  She understands emergency room precautions.    Lab orders were placed to reassess liver function and lipase level in the next 2 weeks.

## 2025-04-14 ENCOUNTER — OFFICE VISIT (OUTPATIENT)
Dept: SURGERY | Facility: CLINIC | Age: 60
End: 2025-04-14
Payer: OTHER GOVERNMENT

## 2025-04-14 VITALS — WEIGHT: 184 LBS | BODY MASS INDEX: 26.34 KG/M2 | HEIGHT: 70 IN

## 2025-04-14 DIAGNOSIS — K85.10 ACUTE BILIARY PANCREATITIS, UNSPECIFIED COMPLICATION STATUS: Primary | ICD-10-CM

## 2025-04-14 PROCEDURE — 99203 OFFICE O/P NEW LOW 30 MIN: CPT | Performed by: STUDENT IN AN ORGANIZED HEALTH CARE EDUCATION/TRAINING PROGRAM

## 2025-04-14 RX ORDER — DOCUSATE SODIUM 100 MG/1
100 CAPSULE, LIQUID FILLED ORAL 2 TIMES DAILY
COMMUNITY

## 2025-04-14 NOTE — PROGRESS NOTES
Patient Name:  Sera Weeks  YOB: 1965  [id number]    Referring Provider: Lesley Lindsey APRN    Patient Care Team:  Ting Pastrana APRN as PCP - General (Nurse Practitioner)  Gilmar Nunn MD as Consulting Physician (Orthopedic Surgery)  Chiara Rod MD as Consulting Physician (Obstetrics and Gynecology)      Chief Complaint  S/P LAPCHOLE ON 25 AT [facility] (PATIENT STATES SHE NEEDS A DRAIN REMOVAL.)    Subjective     Sera Weeks is a 59 y.o. female who presents to [facility] GENERAL SURGERY    History of Present Illness  59-year-old female who presents today as a new referral for a THAIS drain removal.  Patient was in [place] for the winter and when traveling home to [place] developed severe abdominal pain.  She presented to an outlying facility in [place] and was diagnosed with biliary pancreatitis.  She subsequently underwent laparoscopic cholecystectomy and a THAIS drain was placed in the right upper quadrant.  She was discharged from the facility approximately 1 week ago and has been doing well at home since.  She denies any nausea, vomiting, fevers, chills.  She has had serous drainage from her drain totaling less than 20 cc/day for the past several days.      History     Past Medical History:   Diagnosis Date    Abdominal pain     Anesthesia complication     HYPOTENSION    Arthritis     Borderline anemia     Gallbladder & bile duct stone with obstruction     Intramural leiomyoma of uterus     PONV (postoperative nausea and vomiting)     Rosacea        Past Surgical History:   Procedure Laterality Date    BREAST BIOPSY Left      SECTION      X1    D & C HYSTEROSCOPY ENDOMETRIAL ABLATION N/A 10/18/2016    Procedure: DILATATION AND CURETTAGE HYSTEROSCOPY NOVASURE ENDOMETRIAL ABLATION, cervical polypectomy;  Surgeon: Chiara Rod MD;  Location: [facility];  Service:     JOINT REPLACEMENT  2016    LAPAROSCOPIC CHOLECYSTECTOMY   "04/05/2025    TOTAL HIP ARTHROPLASTY Right 07/21/2016    Procedure: RT TOTAL HIP ARTHROPLASTY;  Surgeon: Gilmar Nunn MD;  Location:  [facility];  Service:     TOTAL LAPAROSCOPIC HYSTERECTOMY N/A 01/14/2020    Procedure: TOTAL LAPAROSCOPIC HYSTERECTOMY BILATERAL SALPINGECTOMY VULVAR LESION BIOPSY;  Surgeon: Chiara Rod MD;  Location:  [facility];  Service: Gynecology    WISDOM TOOTH EXTRACTION         Family History   Problem Relation Age of Onset    Diabetes Mother     Hemochromatosis Mother     Hypothyroidism Mother     Arthritis Mother     Cancer Mother         liver (cirrhoisis)    Thyroid disease Mother     Other Mother         Hemochromatosis    Kidney cancer Father     Cancer Father     Hyperlipidemia Father     Kidney disease Father     Hypothyroidism Sister     Thyroid disease Sister     Arthritis Maternal Grandmother     Colon cancer Maternal Grandfather     Cancer Maternal Grandfather     Thyroid disease Maternal Aunt     Thyroid disease Maternal Uncle     Cancer Paternal Aunt     Malig Hyperthermia Neg Hx        Social History     Tobacco Use    Smoking status: Never    Smokeless tobacco: Never   Vaping Use    Vaping status: Never Used   Substance Use Topics    Alcohol use: Yes     Comment: Some weeks zero.  Some weeks 1-2    Drug use: No       Allergies   Allergen Reactions    Mobic [Meloxicam] Nausea And Vomiting    Other      VIVOMAX- MADE PATIENT FELL VERY WEIRD    Sulfa Antibiotics Nausea And Vomiting    Vimovo [Naproxen-Esomeprazole Mg] Nausea Only and Other (See Comments)     \"JUST DIDN'T FEEL WELL ALL THE WAY AROUND\"    Terramycin [Oxytetracycline] Rash       Prior to Admission medications    Medication Sig Start Date End Date Taking? Authorizing Provider   docusate sodium (COLACE) 100 MG capsule Take 1 capsule by mouth 2 (Two) Times a Day.   Yes Provider, MD Isreal   oxyCODONE-acetaminophen (PERCOCET)  MG per tablet Take 1 tablet by mouth Every 6 (Six) Hours As " "Needed.  Patient not taking: Reported on 4/14/2025 4/6/25   Provider, MD Isreal       Objective    Objective              Vital Signs:   Ht 177.8 cm (70\")   Wt 83.5 kg (184 lb)   BMI 26.40 kg/m²       Physical Exam  Constitutional:       Appearance: Normal appearance.   HENT:      Head: Normocephalic and atraumatic.      Mouth/Throat:      Mouth: Mucous membranes are moist.      Pharynx: Oropharynx is clear.   Cardiovascular:      Rate and Rhythm: Normal rate and regular rhythm.   Pulmonary:      Effort: Pulmonary effort is normal. No respiratory distress.   Abdominal:      General: There is no distension.      Palpations: Abdomen is soft.      Tenderness: There is no abdominal tenderness.      Comments: Laparoscopic incisions x 3 all healing well; right sided THAIS drain in place with minimal serosanguineous output in bulb   Musculoskeletal:         General: No swelling. Normal range of motion.      Cervical back: Normal range of motion and neck supple.   Skin:     General: Skin is warm and dry.   Neurological:      General: No focal deficit present.      Mental Status: She is alert and oriented to person, place, and time.   Psychiatric:         Mood and Affect: Mood normal.         Behavior: Behavior normal.                Assessment / Plan      Diagnoses and all orders for this visit:    1. Acute biliary pancreatitis, unspecified complication status (Primary)    59-year-old female status post laparoscopic cholecystectomy with THAIS drain placement and outlying facility for biliary pancreatitis.  Overall she is doing well at this time.  THAIS drain removed today in the office without difficulty.  She may otherwise follow-up with me again in clinic as needed.    Follow Up   Return if symptoms worsen or fail to improve.      Patient was given instructions and counseling regarding her condition or for health maintenance advice. Please see specific information pulled into the AVS if appropriate.     Electronically " signed by Terrell Knapp MD, 04/14/25, 10:38 AM EDT.

## 2025-04-18 ENCOUNTER — PATIENT ROUNDING (BHMG ONLY) (OUTPATIENT)
Dept: SURGERY | Facility: CLINIC | Age: 60
End: 2025-04-18
Payer: OTHER GOVERNMENT

## 2025-04-18 NOTE — PROGRESS NOTES
Hello, my name is Ra. I am with [facility], [address], [address], [place] [address]. In an effort to hear the opinions of our patients, I would like to ask you a few questions about your visit with our office.     Can you tell me about your visit with us? What things went well?    We're always looking for ways to make our patients' experiences even better. Do you have any recommendations on ways we may improve?    Overall, were you satisfied with your first visit to our practice?    Is there a particular staff member/s who you would like to recognize for doing a great job?      I appreciate you taking the time to answer these questions. The providers and staff here in our office want you to get the healthcare you need and deserve and we look forward to your comments.     Thank you for your input and have a wonderful day!

## 2025-04-23 ENCOUNTER — LAB (OUTPATIENT)
Dept: LAB | Facility: HOSPITAL | Age: 60
End: 2025-04-23
Payer: OTHER GOVERNMENT

## 2025-04-23 DIAGNOSIS — R74.01 TRANSAMINITIS: ICD-10-CM

## 2025-04-23 DIAGNOSIS — K85.10 ACUTE BILIARY PANCREATITIS, UNSPECIFIED COMPLICATION STATUS: ICD-10-CM

## 2025-04-23 LAB
ALBUMIN SERPL-MCNC: 4.1 G/DL (ref 3.5–5.2)
ALBUMIN/GLOB SERPL: 1.4 G/DL
ALP SERPL-CCNC: 105 U/L (ref 39–117)
ALT SERPL W P-5'-P-CCNC: 23 U/L (ref 1–33)
ANION GAP SERPL CALCULATED.3IONS-SCNC: 9.1 MMOL/L (ref 5–15)
AST SERPL-CCNC: 20 U/L (ref 1–32)
BASOPHILS # BLD AUTO: 0.06 10*3/MM3 (ref 0–0.2)
BASOPHILS NFR BLD AUTO: 0.7 % (ref 0–1.5)
BILIRUB SERPL-MCNC: 0.7 MG/DL (ref 0–1.2)
BUN SERPL-MCNC: 6 MG/DL (ref 6–20)
BUN/CREAT SERPL: 7.4 (ref 7–25)
CALCIUM SPEC-SCNC: 9.7 MG/DL (ref 8.6–10.5)
CHLORIDE SERPL-SCNC: 101 MMOL/L (ref 98–107)
CO2 SERPL-SCNC: 29.9 MMOL/L (ref 22–29)
CREAT SERPL-MCNC: 0.81 MG/DL (ref 0.57–1)
DEPRECATED RDW RBC AUTO: 44.9 FL (ref 37–54)
EGFRCR SERPLBLD CKD-EPI 2021: 84.8 ML/MIN/1.73
EOSINOPHIL # BLD AUTO: 0.32 10*3/MM3 (ref 0–0.4)
EOSINOPHIL NFR BLD AUTO: 3.6 % (ref 0.3–6.2)
ERYTHROCYTE [DISTWIDTH] IN BLOOD BY AUTOMATED COUNT: 13.2 % (ref 12.3–15.4)
GLOBULIN UR ELPH-MCNC: 3 GM/DL
GLUCOSE SERPL-MCNC: 92 MG/DL (ref 65–99)
HCT VFR BLD AUTO: 42.4 % (ref 34–46.6)
HGB BLD-MCNC: 12.8 G/DL (ref 12–15.9)
IMM GRANULOCYTES # BLD AUTO: 0.02 10*3/MM3 (ref 0–0.05)
IMM GRANULOCYTES NFR BLD AUTO: 0.2 % (ref 0–0.5)
LIPASE SERPL-CCNC: 130 U/L (ref 13–60)
LYMPHOCYTES # BLD AUTO: 2.78 10*3/MM3 (ref 0.7–3.1)
LYMPHOCYTES NFR BLD AUTO: 31.6 % (ref 19.6–45.3)
MCH RBC QN AUTO: 27.9 PG (ref 26.6–33)
MCHC RBC AUTO-ENTMCNC: 30.2 G/DL (ref 31.5–35.7)
MCV RBC AUTO: 92.4 FL (ref 79–97)
MONOCYTES # BLD AUTO: 0.52 10*3/MM3 (ref 0.1–0.9)
MONOCYTES NFR BLD AUTO: 5.9 % (ref 5–12)
NEUTROPHILS NFR BLD AUTO: 5.11 10*3/MM3 (ref 1.7–7)
NEUTROPHILS NFR BLD AUTO: 58 % (ref 42.7–76)
PLATELET # BLD AUTO: 387 10*3/MM3 (ref 140–450)
PMV BLD AUTO: 8.7 FL (ref 6–12)
POTASSIUM SERPL-SCNC: 4.3 MMOL/L (ref 3.5–5.2)
PROT SERPL-MCNC: 7.1 G/DL (ref 6–8.5)
RBC # BLD AUTO: 4.59 10*6/MM3 (ref 3.77–5.28)
SODIUM SERPL-SCNC: 140 MMOL/L (ref 136–145)
WBC NRBC COR # BLD AUTO: 8.81 10*3/MM3 (ref 3.4–10.8)

## 2025-04-23 PROCEDURE — 85025 COMPLETE CBC W/AUTO DIFF WBC: CPT

## 2025-04-23 PROCEDURE — 83690 ASSAY OF LIPASE: CPT

## 2025-04-23 PROCEDURE — 80053 COMPREHEN METABOLIC PANEL: CPT

## 2025-04-23 PROCEDURE — 36415 COLL VENOUS BLD VENIPUNCTURE: CPT

## 2025-04-25 ENCOUNTER — TELEMEDICINE (OUTPATIENT)
Dept: FAMILY MEDICINE CLINIC | Facility: TELEHEALTH | Age: 60
End: 2025-04-25
Payer: OTHER GOVERNMENT

## 2025-04-25 ENCOUNTER — E-VISIT (OUTPATIENT)
Dept: FAMILY MEDICINE CLINIC | Facility: TELEHEALTH | Age: 60
End: 2025-04-25

## 2025-04-25 DIAGNOSIS — R39.89 SUSPECTED UTI: Primary | ICD-10-CM

## 2025-04-25 PROBLEM — Z86.0100 HISTORY OF COLONIC POLYPS: Status: ACTIVE | Noted: 2024-05-20

## 2025-04-25 PROBLEM — M77.11 LATERAL EPICONDYLITIS OF RIGHT ELBOW: Status: ACTIVE | Noted: 2024-07-04

## 2025-04-25 PROBLEM — K85.90 PANCREATITIS: Status: ACTIVE | Noted: 2025-04-02

## 2025-04-25 PROBLEM — Q65.89 CONGENITAL HIP DYSPLASIA: Status: ACTIVE | Noted: 2025-04-25

## 2025-04-25 PROBLEM — K57.90 DIVERTICULAR DISEASE: Status: ACTIVE | Noted: 2025-04-02

## 2025-04-25 PROCEDURE — 99213 OFFICE O/P EST LOW 20 MIN: CPT | Performed by: NURSE PRACTITIONER

## 2025-04-25 RX ORDER — NITROFURANTOIN 25; 75 MG/1; MG/1
100 CAPSULE ORAL 2 TIMES DAILY
Qty: 10 CAPSULE | Refills: 0 | Status: SHIPPED | OUTPATIENT
Start: 2025-04-25 | End: 2025-04-30

## 2025-04-25 NOTE — E-VISIT ESCALATED
Date: 2025 07:43:19  Clinician: Priscilla Mckee  Clinician NPI: [id number]  Patient: Sera Weeks  Patient : 1965  Patient Address: [address] [place] [address]  Patient Phone: [phone]  Visit Protocol: UTI  Patient Summary:  Sera is a 59 year old ( : 1965 ) female who initiated a visit for a presumed bladder infection.    The symptoms started 1-3 days ago and consist of dysuria, foul-smelling urine, urgency, and urinary frequency.   Symptom   details   Urine color: Yellow    Denied symptoms include urinary incontinence, vaginal itching, abdominal pain, vaginal discharge, feeling as if the bladder is never empty, vomiting, nausea, and chills. Sera denies flank pain. Sera does not feel   feverish.   Sera has used over-the-counter medications or home remedies to relieve the current symptoms.  Precipitating events  Sera denies having a sexually transmitted infection.  Pertinent medical history  Sera has experienced problems or side   effects with the following antibiotics in the past: sulfamethoxazole-trimethoprim (Bactrim DS)   Sera does not get a yeast infection when taking antibiotics.   Sera does not get yeast infections when taking antibiotics.   Sera does not have a history   of bladder infections. Sera has not had a procedure or surgery done to the urinary tract. Sera has not been diagnosed with advanced kidney disease.   Sera has not used a catheter and denies being a patient in a hospital or nursing home in the past 2   weeks. Sera does not have diabetes. Immunosuppressive conditions (e.g., chemotherapy, HIV, organ transplant, long-term use of steroids or other immunosuppressive medications, splenectomy) were denied.   Sera does not smoke or use smokeless tobacco.   Sera does not vape or use other e-cigarette products.   Additional information as reported by the patient (free text): I was released from hospital on  after having ERCP and gall  bladder removed.  Had a drain in place until 4/14.     MEDICATIONS: No current medications, ALLERGIES: Sulfa (Sulfonamide Antibiotics)  Clinician Response:  Dear Sera,   I am sorry you are not feeling well. To determine the most appropriate care for you, I would like you to be seen in person to further discuss your health history and symptoms.  You will not be charged for this visit.   Thank you for trusting us with your care.   Diagnosis: Refer for additional evaluation  Diagnosis ICD: R69  Additional Clinician Notes:  already seen virtually

## 2025-04-25 NOTE — PATIENT INSTRUCTIONS
Drink plenty of water and avoid caffeine  If symptoms do not improve in 3-5 days follow up with your primary care provider or urgent care        Urinary Tract Infection, Female  A urinary tract infection (UTI) is an infection in your urinary tract. The urinary tract is made up of organs that make, store, and get rid of pee (urine) in your body. These organs include:  The kidneys.  The ureters.  The bladder.  The urethra.  What are the causes?  Most UTIs are caused by germs called bacteria. They may be in or near your genitals. These germs grow and cause swelling in your urinary tract.  What increases the risk?  You're more likely to get a UTI if:  You're a female. The urethra is shorter in females than in males.  You have a soft tube called a catheter that drains your pee.  You can't control when you pee or poop.  You have trouble peeing because of:  A kidney stone.  A urinary blockage.  A nerve condition that affects your bladder.  Not getting enough to drink.  You're sexually active.  You use a birth control inside your vagina, like spermicide.  You're pregnant.  You have low levels of the hormone estrogen in your body.  You're an older adult.  You're also more likely to get a UTI if you have other health problems. These may include:  Diabetes.  A weak immune system. Your immune system is your body's defense system.  Sickle cell disease.  Injury of the spine.  What are the signs or symptoms?  Symptoms may include:  Needing to pee right away.  Peeing small amounts often.  Pain or burning when you pee.  Blood in your pee.  Pee that smells bad or odd.  Pain in your belly or lower back.  You may also:  Feel confused. This may be the first symptom in older adults.  Vomit.  Not feel hungry.  Feel tired or easily annoyed.  Have a fever or chills.  How is this diagnosed?  A UTI is diagnosed based on your medical history and an exam. You may also have other tests. These may include:  Pee tests.  Blood tests.  Tests for  sexually transmitted infections (STIs).  If you've had more than one UTI, you may need to have imaging studies done to find out why you keep getting them.  How is this treated?  A UTI can be treated by:  Taking antibiotics or other medicines.  Drinking enough fluid to keep your pee pale yellow.  In rare cases, a UTI can cause a very bad condition called sepsis. Sepsis may be treated in the hospital.  Follow these instructions at home:  Medicines  Take your medicines only as told by your health care provider.  If you were given antibiotics, take them as told by your provider. Do not stop taking them even if you start to feel better.  General instructions  Make sure you:  Pee often and fully. Do not hold your pee for a long time.  Wipe from front to back after you pee or poop. Use each tissue only once when you wipe.  Pee after you have sex.  Do not douche or use sprays or powders in your genital area.  Contact a health care provider if:  Your symptoms don't get better after 1-2 days of taking antibiotics.  Your symptoms go away and then come back.  You have a fever or chills.  You vomit or feel like you may vomit.  Get help right away if:  You have very bad pain in your back or lower belly.  You faint.  This information is not intended to replace advice given to you by your health care provider. Make sure you discuss any questions you have with your health care provider.  Document Revised: 07/25/2024 Document Reviewed: 03/22/2024  [employer] Patient Education © 2024 [employer]

## 2025-04-25 NOTE — PROGRESS NOTES
CHIEF COMPLAINT  Chief Complaint   Patient presents with    Urinary Tract Infection         HPI  Sera Weeks is a 59 y.o. female  presents with complaint of symptoms of UTI that started with cloudy urine 3 days ago. Started Cranberry pills and increased water intake. It initially helped, but then today, she developed burning and urgency.     Review of Systems   Constitutional:  Negative for chills, diaphoresis, fatigue and fever.   Gastrointestinal:  Negative for nausea and vomiting.   Genitourinary:  Positive for dysuria, frequency and urgency. Negative for decreased urine volume, difficulty urinating, enuresis, flank pain, genital sores, hematuria, menstrual problem, pelvic pain, vaginal bleeding, vaginal discharge and vaginal pain.   Musculoskeletal:  Negative for back pain.       Past Medical History:   Diagnosis Date    Abdominal pain     Anesthesia complication     HYPOTENSION    Arthritis     Borderline anemia     Gallbladder & bile duct stone with obstruction     Intramural leiomyoma of uterus     PONV (postoperative nausea and vomiting)     Rosacea        Family History   Problem Relation Age of Onset    Diabetes Mother     Hemochromatosis Mother     Hypothyroidism Mother     Arthritis Mother     Cancer Mother         liver (cirrhoisis)    Thyroid disease Mother     Other Mother         Hemochromatosis    Kidney cancer Father     Cancer Father     Hyperlipidemia Father     Kidney disease Father     Hypothyroidism Sister     Thyroid disease Sister     Arthritis Maternal Grandmother     Colon cancer Maternal Grandfather     Cancer Maternal Grandfather     Thyroid disease Maternal Aunt     Thyroid disease Maternal Uncle     Cancer Paternal Aunt     Malig Hyperthermia Neg Hx        Social History     Socioeconomic History    Marital status:     Number of children: 1   Tobacco Use    Smoking status: Never     Passive exposure: Never    Smokeless tobacco: Never   Vaping Use    Vaping status: Never Used    Substance and Sexual Activity    Alcohol use: Yes     Comment: Some weeks zero.  Some weeks 1-2    Drug use: No    Sexual activity: Yes     Partners: Male     Birth control/protection: Hysterectomy     Comment: . One partner.         LMP 12/25/2019 (Exact Date)   Breastfeeding No     PHYSICAL EXAM  Physical Exam   Constitutional: She is oriented to person, place, and time. She appears well-developed and well-nourished. She does not have a sickly appearance. She does not appear ill. No distress.   HENT:   Head: Normocephalic and atraumatic.   Eyes: EOM are normal.   Neck: Neck normal appearance.  Pulmonary/Chest: Effort normal.  No respiratory distress.  Neurological: She is alert and oriented to person, place, and time.   Skin: Skin is dry.   Psychiatric: She has a normal mood and affect.           Diagnoses and all orders for this visit:    1. Suspected UTI (Primary)    Other orders  -     nitrofurantoin, macrocrystal-monohydrate, (Macrobid) 100 MG capsule; Take 1 capsule by mouth 2 (Two) Times a Day for 5 days.  Dispense: 10 capsule; Refill: 0        Mode of visit: Video   Myself and Sera Weeks participated in this visit. The patient is located in [address] [place] [address]. I am located in [place], [place]. [employer] and [employer] were utilized.   You have chosen to receive care through a telehealth visit.     Does the patient consent to use a video/audio connection for your medical care today? Yes       Note Disclaimer: At [facility], we believe that sharing information builds trust and better   relationships. You are receiving this note because you recently visited [facility]. It is possible you   will see health information before a provider has talked with you about it. This kind of information can   be easy to misunderstand. To help you fully understand what it means for your health, we urge you to   discuss this note with your provider.    Priscilla Mckee,  APRN  04/25/2025  13:14 EDT

## 2025-05-19 ENCOUNTER — OFFICE VISIT (OUTPATIENT)
Dept: FAMILY MEDICINE CLINIC | Age: 60
End: 2025-05-19
Payer: OTHER GOVERNMENT

## 2025-05-19 ENCOUNTER — HOSPITAL ENCOUNTER (OUTPATIENT)
Dept: GENERAL RADIOLOGY | Facility: HOSPITAL | Age: 60
Discharge: HOME | End: 2025-05-19
Admitting: STUDENT IN AN ORGANIZED HEALTH CARE EDUCATION/TRAINING PROGRAM
Payer: OTHER GOVERNMENT

## 2025-05-19 VITALS
OXYGEN SATURATION: 98 % | HEIGHT: 70 IN | BODY MASS INDEX: 26.77 KG/M2 | WEIGHT: 187 LBS | HEART RATE: 81 BPM | SYSTOLIC BLOOD PRESSURE: 114 MMHG | DIASTOLIC BLOOD PRESSURE: 86 MMHG | TEMPERATURE: 97.6 F

## 2025-05-19 DIAGNOSIS — M79.671 RIGHT FOOT PAIN: ICD-10-CM

## 2025-05-19 DIAGNOSIS — M79.671 RIGHT FOOT PAIN: Primary | ICD-10-CM

## 2025-05-19 PROCEDURE — 73630 X-RAY EXAM OF FOOT: CPT

## 2025-05-19 PROCEDURE — 99213 OFFICE O/P EST LOW 20 MIN: CPT | Performed by: STUDENT IN AN ORGANIZED HEALTH CARE EDUCATION/TRAINING PROGRAM

## 2025-05-19 RX ORDER — METHYLPREDNISOLONE 4 MG/1
TABLET ORAL
Qty: 21 TABLET | Refills: 0 | Status: SHIPPED | OUTPATIENT
Start: 2025-05-19

## 2025-05-19 NOTE — PROGRESS NOTES
Chief Complaint     Foot Pain (Right foot pain, sore to the touch- rolled the ankle back in the winter)    History of Present Illness     Sera Weeks is a 59 y.o. female who presents to [facility].     Patient or patient representative verbalized consent for the use of Ambient Listening during the visit with  BEAU Shay for chart documentation. 5/19/2025  08:12 EDT      History of Present Illness  The patient is a 59-year-old female who presents for evaluation of right foot pain.    She reports a bony protrusion on the side of her right foot, which has been causing discomfort for approximately 4 to 5 months, beginning around 12/2024. The onset of symptoms was noted during a winter trip to [place], where she experienced soreness while walking in flip-flops. The pain subsided when she switched to tennis shoes. Upon returning home, she noticed that prolonged barefoot walking on cushioned floors exacerbated the soreness. She also reports tenderness upon lateral pressure applied to the foot. The pain is described as sharp upon initial contact or impact, transitioning to a dull ache with prolonged standing or activity. She rates the pain as a 6 or 7 out of 10 upon waking and walking, but it decreases with continued movement. Excessive walking without proper footwear results in fatigue. She has been managing the pain by avoiding flip-flops and opting for comfortable shoes with a wide toe box. She recalls a similar experience with plantar fasciitis years ago, but notes that the current pain is not localized to the same area.    She has a history of multiple ankle fractures from years ago, which occasionally result in mild swelling and tenderness, but no severe sprains. She is uncertain if these previous injuries are contributing to her current symptoms.    Additionally, she reports a persistent knot on her leg, which she attributes to kicking off blankets at night. The area  remains swollen but is not painful.    She is due for a physical examination and laboratory workup.         History      Past Medical History:   Diagnosis Date    Abdominal pain     Anesthesia complication     HYPOTENSION    Arthritis     Borderline anemia     Gallbladder & bile duct stone with obstruction     Intramural leiomyoma of uterus     PONV (postoperative nausea and vomiting)     Rosacea        Past Surgical History:   Procedure Laterality Date    BREAST BIOPSY Left 2012     SECTION      X1    D & C HYSTEROSCOPY ENDOMETRIAL ABLATION N/A 10/18/2016    Procedure: DILATATION AND CURETTAGE HYSTEROSCOPY NOVASURE ENDOMETRIAL ABLATION, cervical polypectomy;  Surgeon: Chiara Rod MD;  Location: [facility];  Service:     JOINT REPLACEMENT  2016    LAPAROSCOPIC CHOLECYSTECTOMY  2025    TOTAL HIP ARTHROPLASTY Right 2016    Procedure: RT TOTAL HIP ARTHROPLASTY;  Surgeon: Gilmar Nunn MD;  Location: [facility];  Service:     TOTAL LAPAROSCOPIC HYSTERECTOMY N/A 2020    Procedure: TOTAL LAPAROSCOPIC HYSTERECTOMY BILATERAL SALPINGECTOMY VULVAR LESION BIOPSY;  Surgeon: Chiara Rod MD;  Location: [facility];  Service: Gynecology    WISDOM TOOTH EXTRACTION         Family History   Problem Relation Age of Onset    Diabetes Mother     Hemochromatosis Mother     Hypothyroidism Mother     Arthritis Mother     Cancer Mother         liver (cirrhoisis)    Thyroid disease Mother     Other Mother         Hemochromatosis    Kidney cancer Father     Cancer Father     Hyperlipidemia Father     Kidney disease Father     Hypothyroidism Sister     Thyroid disease Sister     Arthritis Maternal Grandmother     Colon cancer Maternal Grandfather     Cancer Maternal Grandfather     Thyroid disease Maternal Aunt     Thyroid disease Maternal Uncle     Cancer Paternal Aunt     Malig Hyperthermia Neg Hx         Current Medications        Current Outpatient Medications:     methylPREDNISolone  "(MEDROL) 4 MG dose pack, Take as directed on package instructions., Disp: 21 tablet, Rfl: 0     Allergies     Allergies   Allergen Reactions    Mobic [Meloxicam] Nausea And Vomiting    Other      VIVOMAX- MADE PATIENT FELL VERY WEIRD    Sulfa Antibiotics Nausea And Vomiting    Vimovo [Naproxen-Esomeprazole Mg] Nausea Only and Other (See Comments)     \"JUST DIDN'T FEEL WELL ALL THE WAY AROUND\"    Terramycin [Oxytetracycline] Rash       Social History       Social History     Social History Narrative    Not on file       Immunizations     Immunization:  Immunization History   Administered Date(s) Administered    Tdap 09/22/2017          Objective     Objective     Vital Signs:   /86 (BP Location: Right arm, Patient Position: Sitting, Cuff Size: Adult)   Pulse 81   Temp 97.6 °F (36.4 °C) (Oral)   Ht 177.8 cm (70\")   Wt 84.8 kg (187 lb)   SpO2 98%   BMI 26.83 kg/m²       Physical Exam  Vitals and nursing note reviewed.   Constitutional:       Appearance: Normal appearance. She is overweight.   HENT:      Head: Normocephalic.   Eyes:      Conjunctiva/sclera: Conjunctivae normal.      Pupils: Pupils are equal, round, and reactive to light.   Cardiovascular:      Rate and Rhythm: Normal rate and regular rhythm.      Pulses: Normal pulses.      Heart sounds: Normal heart sounds.   Pulmonary:      Effort: Pulmonary effort is normal.      Breath sounds: Normal breath sounds.   Abdominal:      General: Bowel sounds are normal.      Palpations: Abdomen is soft.   Musculoskeletal:         General: Normal range of motion.      Cervical back: Normal range of motion and neck supple.      Right foot: Tenderness and bony tenderness present.   Skin:     General: Skin is warm and dry.   Neurological:      General: No focal deficit present.      Mental Status: She is alert and oriented to person, place, and time.   Psychiatric:         Attention and Perception: Attention normal.         Mood and Affect: Mood and affect " normal.         Behavior: Behavior normal. Behavior is cooperative.         Physical Exam  Respiratory: Clear to auscultation, no wheezing, rales or rhonchi  Extremities: Tenderness noted on the right foot over the bony protrusion      Results    The following data was reviewed by: BEAU Shay on 05/19/25             XR Foot 3+ View Right (05/19/2025 09:00)   Results           Assessment and Plan        Assessment and Plan       Right foot pain    Orders:    XR Foot 3+ View Right; Future    methylPREDNISolone (MEDROL) 4 MG dose pack; Take as directed on package instructions.         Assessment & Plan  1. Right foot pain.  - The patient reports a bony protrusion on the side of her right foot, which has been causing discomfort for approximately 4 to 5 months. The onset of symptoms was noted during a winter trip to [place], where she experienced soreness while walking in flip-flops. The pain subsided when she switched to tennis shoes. Upon returning home, she noticed that prolonged barefoot walking on cushioned floors exacerbated the soreness. She also reports tenderness upon lateral pressure applied to the foot.  - The pain is described as sharp upon initial contact or impact, transitioning to a dull ache with prolonged standing or activity. She rates the pain as a 6 or 7 out of 10 upon waking and walking, but it decreases with continued movement. Excessive walking without proper footwear results in fatigue. She has been managing the pain by avoiding flip-flops and opting for comfortable shoes with a wide toe box. She has a history of multiple ankle fractures from years ago, which occasionally result in mild swelling and tenderness, but no severe sprains. She is uncertain if these previous injuries are contributing to her current symptoms.  - An x-ray of the right foot will be ordered to rule out any potential stress fractures. She is advised to wear supportive footwear, rest the foot when possible, apply ice,  elevate the foot if swelling occurs, and consider using a compression sleeve.  - A prescription for Medrol Dosepak will be sent to pharmacy to manage any inflammation. She is also advised to take ibuprofen 600 to 800 mg three times daily. Depending on the x-ray results, a referral to podiatry may be considered.    2. Swollen knot on leg.  - She reports a persistent knot on her leg, which she attributes to kicking off blankets at night. The area remains swollen but is not painful.  - The ibuprofen and Medrol Dosepak prescribed for her right foot pain should also help with this issue.  - Compression may be used if needed.  - Monitoring for any changes in the swelling or pain will be necessary.    3. Health maintenance.  - She is due for a physical examination and laboratory workup.  - Review of previous lab results indicates an elevated lipase level, necessitating follow-up testing.  - Scheduling for a comprehensive physical examination and additional lab work, including cholesterol and thyroid function tests, is recommended.        Follow Up        Follow Up   Return for With PCP, Next scheduled follow up, sooner if condition worsens.  Patient was given instructions and counseling regarding her condition or for health maintenance advice. Please see specific information pulled into the AVS if appropriate.

## 2025-07-16 ENCOUNTER — OFFICE VISIT (OUTPATIENT)
Dept: FAMILY MEDICINE CLINIC | Age: 60
End: 2025-07-16
Payer: OTHER GOVERNMENT

## 2025-07-16 ENCOUNTER — LAB (OUTPATIENT)
Dept: LAB | Facility: HOSPITAL | Age: 60
End: 2025-07-16
Payer: OTHER GOVERNMENT

## 2025-07-16 VITALS
BODY MASS INDEX: 26.94 KG/M2 | TEMPERATURE: 98.5 F | HEART RATE: 66 BPM | SYSTOLIC BLOOD PRESSURE: 123 MMHG | WEIGHT: 188.2 LBS | DIASTOLIC BLOOD PRESSURE: 76 MMHG | OXYGEN SATURATION: 100 % | HEIGHT: 70 IN

## 2025-07-16 DIAGNOSIS — Z13.6 SCREENING FOR CARDIOVASCULAR CONDITION: ICD-10-CM

## 2025-07-16 DIAGNOSIS — Z86.2 HISTORY OF ANEMIA: ICD-10-CM

## 2025-07-16 DIAGNOSIS — Z83.49 FAMILY HISTORY OF HEMOCHROMATOSIS: ICD-10-CM

## 2025-07-16 DIAGNOSIS — Z00.00 ROUTINE GENERAL MEDICAL EXAMINATION AT A HEALTH CARE FACILITY: Primary | ICD-10-CM

## 2025-07-16 DIAGNOSIS — Z87.19 HISTORY OF PANCREATITIS: ICD-10-CM

## 2025-07-16 DIAGNOSIS — K85.10 ACUTE BILIARY PANCREATITIS, UNSPECIFIED COMPLICATION STATUS: ICD-10-CM

## 2025-07-16 DIAGNOSIS — R82.90 CLOUDY URINE: ICD-10-CM

## 2025-07-16 DIAGNOSIS — Z83.3 FAMILY HISTORY OF DIABETES MELLITUS: ICD-10-CM

## 2025-07-16 DIAGNOSIS — Z90.710 H/O: HYSTERECTOMY: ICD-10-CM

## 2025-07-16 DIAGNOSIS — Z83.49 FAMILY HISTORY OF THYROID DISORDER: ICD-10-CM

## 2025-07-16 LAB
ALBUMIN SERPL-MCNC: 4.5 G/DL (ref 3.5–5.2)
ALBUMIN/GLOB SERPL: 1.5 G/DL
ALP SERPL-CCNC: 79 U/L (ref 39–117)
ALT SERPL W P-5'-P-CCNC: 21 U/L (ref 1–33)
ANION GAP SERPL CALCULATED.3IONS-SCNC: 11.3 MMOL/L (ref 5–15)
AST SERPL-CCNC: 24 U/L (ref 1–32)
BACTERIA UR QL AUTO: ABNORMAL /HPF
BILIRUB SERPL-MCNC: 0.4 MG/DL (ref 0–1.2)
BILIRUB UR QL STRIP: NEGATIVE
BUN SERPL-MCNC: 11 MG/DL (ref 6–20)
BUN/CREAT SERPL: 12 (ref 7–25)
CALCIUM SPEC-SCNC: 9.6 MG/DL (ref 8.6–10.5)
CHLORIDE SERPL-SCNC: 106 MMOL/L (ref 98–107)
CHOLEST SERPL-MCNC: 230 MG/DL (ref 0–200)
CLARITY UR: ABNORMAL
CO2 SERPL-SCNC: 23.7 MMOL/L (ref 22–29)
COLOR UR: YELLOW
CREAT SERPL-MCNC: 0.92 MG/DL (ref 0.57–1)
DEPRECATED RDW RBC AUTO: 42.7 FL (ref 37–54)
EGFRCR SERPLBLD CKD-EPI 2021: 72.3 ML/MIN/1.73
ERYTHROCYTE [DISTWIDTH] IN BLOOD BY AUTOMATED COUNT: 13.1 % (ref 12.3–15.4)
FERRITIN SERPL-MCNC: 153 NG/ML (ref 13–150)
GLOBULIN UR ELPH-MCNC: 3 GM/DL
GLUCOSE SERPL-MCNC: 104 MG/DL (ref 65–99)
GLUCOSE UR STRIP-MCNC: NEGATIVE MG/DL
HCT VFR BLD AUTO: 42.7 % (ref 34–46.6)
HDLC SERPL-MCNC: 77 MG/DL (ref 40–60)
HGB BLD-MCNC: 13.7 G/DL (ref 12–15.9)
HGB UR QL STRIP.AUTO: ABNORMAL
IRON 24H UR-MRATE: 64 MCG/DL (ref 37–145)
IRON SATN MFR SERPL: 16 % (ref 20–50)
KETONES UR QL STRIP: NEGATIVE
LDLC SERPL CALC-MCNC: 132 MG/DL (ref 0–100)
LDLC/HDLC SERPL: 1.68 {RATIO}
LEUKOCYTE ESTERASE UR QL STRIP.AUTO: ABNORMAL
LIPASE SERPL-CCNC: 33 U/L (ref 13–60)
MCH RBC QN AUTO: 28.2 PG (ref 26.6–33)
MCHC RBC AUTO-ENTMCNC: 32.1 G/DL (ref 31.5–35.7)
MCV RBC AUTO: 88 FL (ref 79–97)
NITRITE UR QL STRIP: POSITIVE
PH UR STRIP.AUTO: 5.5 [PH] (ref 5–8)
PLATELET # BLD AUTO: 288 10*3/MM3 (ref 140–450)
PMV BLD AUTO: 8.3 FL (ref 6–12)
POTASSIUM SERPL-SCNC: 4.4 MMOL/L (ref 3.5–5.2)
PROT SERPL-MCNC: 7.5 G/DL (ref 6–8.5)
PROT UR QL STRIP: NEGATIVE
RBC # BLD AUTO: 4.85 10*6/MM3 (ref 3.77–5.28)
RBC # UR STRIP: ABNORMAL /HPF
REF LAB TEST METHOD: ABNORMAL
SODIUM SERPL-SCNC: 141 MMOL/L (ref 136–145)
SP GR UR STRIP: 1.02 (ref 1–1.03)
SQUAMOUS #/AREA URNS HPF: ABNORMAL /HPF
T3FREE SERPL-MCNC: 2.91 PG/ML (ref 2–4.4)
T4 FREE SERPL-MCNC: 1.07 NG/DL (ref 0.92–1.68)
TIBC SERPL-MCNC: 399 MCG/DL (ref 298–536)
TRANSFERRIN SERPL-MCNC: 268 MG/DL (ref 200–360)
TRIGL SERPL-MCNC: 119 MG/DL (ref 0–150)
TSH SERPL DL<=0.05 MIU/L-ACNC: 2.13 UIU/ML (ref 0.27–4.2)
UROBILINOGEN UR QL STRIP: ABNORMAL
VLDLC SERPL-MCNC: 21 MG/DL (ref 5–40)
WBC # UR STRIP: ABNORMAL /HPF
WBC NRBC COR # BLD AUTO: 5.99 10*3/MM3 (ref 3.4–10.8)

## 2025-07-16 PROCEDURE — 84482 T3 REVERSE: CPT

## 2025-07-16 PROCEDURE — 83690 ASSAY OF LIPASE: CPT

## 2025-07-16 PROCEDURE — 85027 COMPLETE CBC AUTOMATED: CPT

## 2025-07-16 PROCEDURE — 86376 MICROSOMAL ANTIBODY EACH: CPT

## 2025-07-16 PROCEDURE — 99396 PREV VISIT EST AGE 40-64: CPT | Performed by: NURSE PRACTITIONER

## 2025-07-16 PROCEDURE — 83540 ASSAY OF IRON: CPT

## 2025-07-16 PROCEDURE — 84443 ASSAY THYROID STIM HORMONE: CPT

## 2025-07-16 PROCEDURE — 82728 ASSAY OF FERRITIN: CPT

## 2025-07-16 PROCEDURE — 84466 ASSAY OF TRANSFERRIN: CPT

## 2025-07-16 PROCEDURE — 84481 FREE ASSAY (FT-3): CPT

## 2025-07-16 PROCEDURE — 36415 COLL VENOUS BLD VENIPUNCTURE: CPT

## 2025-07-16 PROCEDURE — 84439 ASSAY OF FREE THYROXINE: CPT

## 2025-07-16 PROCEDURE — 80053 COMPREHEN METABOLIC PANEL: CPT

## 2025-07-16 PROCEDURE — 86800 THYROGLOBULIN ANTIBODY: CPT

## 2025-07-16 PROCEDURE — 80061 LIPID PANEL: CPT

## 2025-07-16 PROCEDURE — 81001 URINALYSIS AUTO W/SCOPE: CPT

## 2025-07-16 NOTE — PROGRESS NOTES
Chief Complaint  Sera Weeks presents to [facility] for Annual Exam      Subjective     History of Present Illness  Sera is here today for annual exam.   Last annual exam was unknown  Last eye exam:  years  PROVIDER: Unknown  Last dental exam:   1 year    PROVIDER:  no local   Last menstrual period: n/a - hysterectomy  Last Completed Pap Smear    This patient has no relevant Health Maintenance data.       Last Completed Mammogram    This patient has no relevant Health Maintenance data.         Diet / exercise:   Well Balanced Diet and regularly several  times weekly  yoga and strengthening     10 year cardiovascular risk assessment  The 10-year ASCVD risk score (Trinity CHUNG, et al., 2019) is: 2.2%    Values used to calculate the score:      Age: 60 years      Sex: Female      Is Non- : No      Diabetic: No      Tobacco smoker: No      Systolic Blood Pressure: 123 mmHg      Is BP treated: No      HDL Cholesterol: 77 mg/dL      Total Cholesterol: 230 mg/dL     Sera Weeks DECLINES OR DEFERS THE FOLLOWING HEALTH MAINTENANCE RECOMMENDATIONS DISCUSSED TODAY:  >Tdap, >Zoster, and >Covid 19 vaccination      Patient Care Team:  Ting Pastrana APRN as PCP - General (Nurse Practitioner)  Gilmar Nunn MD as Consulting Physician (Orthopedic Surgery)  Chiara Rod MD as Consulting Physician (Obstetrics and Gynecology)     She had colonoscopy last year and was told 5 year follow up   had this done at [facility]    She has been having some painful intercourse  dryness and feeling tighter.  This has been going on since her surgery   Assessment and Plan     -well balanced diet and exercise as tolerated  -annual wellness exams are recommended  -health care maintenance and gaps in care discussed and orders have been placed as necessary and as willing by the patient.     Diagnoses and all orders for this visit:    1. Routine general medical examination at a  "health care facility (Primary)    2. Family history of hemochromatosis  Comments:  check levels  Orders:  -     Ferritin; Future  -     Iron and TIBC; Future    3. Family history of thyroid disorder  -     TSH+Free T4; Future  -     T3, free; Future  -     T3, Reverse; Future  -     Thyroid Antibodies; Future    4. Screening for cardiovascular condition  -     Comprehensive metabolic panel; Future  -     Lipid panel; Future    5. History of anemia  -     CBC No Differential; Future    6. Family history of diabetes mellitus    7. History of pancreatitis  -     Lipase; Future    8. Cloudy urine  -     Urinalysis With Microscopic - Urine, Clean Catch; Future    9. H/O: hysterectomy  Comments:  wants salivary cortisol test  will check labs and send testing - advise no pap needed, pelvic exam if desired- consider GYN if pelvic symptoms persist              Follow Up   No follow-ups on file.  No future appointments.    No orders of the defined types were placed in this encounter.      Medications Discontinued During This Encounter   Medication Reason    methylPREDNISolone (MEDROL) 4 MG dose pack *Therapy completed        Review of Systems    Objective     Vitals:    07/16/25 0915   BP: 123/76   BP Location: Right arm   Patient Position: Sitting   Cuff Size: Large Adult   Pulse: 66   Temp: 98.5 °F (36.9 °C)   TempSrc: Oral   SpO2: 100%   Weight: 85.4 kg (188 lb 3.2 oz)   Height: 177.8 cm (70\")     Body mass index is 27 kg/m².       Physical Exam  Vitals reviewed.   Constitutional:       Appearance: Normal appearance. She is well-developed. She is not ill-appearing.   HENT:      Head: Normocephalic and atraumatic.      Right Ear: Tympanic membrane, ear canal and external ear normal.      Left Ear: Tympanic membrane, ear canal and external ear normal.      Mouth/Throat:      Lips: Pink.      Mouth: Mucous membranes are moist.      Pharynx: Oropharynx is clear. Uvula midline. No oropharyngeal exudate.   Eyes:      Extraocular " "Movements: Extraocular movements intact.      Conjunctiva/sclera: Conjunctivae normal.      Pupils: Pupils are equal, round, and reactive to light.   Neck:      Thyroid: No thyromegaly.   Cardiovascular:      Rate and Rhythm: Normal rate and regular rhythm.      Heart sounds: No murmur heard.     No friction rub. No gallop.   Pulmonary:      Effort: Pulmonary effort is normal.      Breath sounds: Normal breath sounds. No wheezing or rhonchi.   Abdominal:      General: Bowel sounds are normal. There is no distension.      Palpations: Abdomen is soft.      Tenderness: There is no abdominal tenderness.   Musculoskeletal:      Cervical back: Normal range of motion.   Lymphadenopathy:      Head:      Right side of head: No submandibular adenopathy.      Left side of head: No submandibular adenopathy.      Cervical: No cervical adenopathy.   Skin:     General: Skin is warm and dry.      Findings: No lesion or rash.   Neurological:      Mental Status: She is alert and oriented to person, place, and time.      Cranial Nerves: No cranial nerve deficit.      Gait: Gait is intact.   Psychiatric:         Mood and Affect: Mood and affect normal.         Behavior: Behavior normal.         Thought Content: Thought content normal.         Judgment: Judgment normal.                 Result Review          Allergies   Allergen Reactions    Mobic [Meloxicam] Nausea And Vomiting    Other      VIVOMAX- MADE PATIENT FELL VERY WEIRD    Sulfa Antibiotics Nausea And Vomiting    Vimovo [Naproxen-Esomeprazole Mg] Nausea Only and Other (See Comments)     \"JUST DIDN'T FEEL WELL ALL THE WAY AROUND\"    Terramycin [Oxytetracycline] Rash      Past Medical History:   Diagnosis Date    Abdominal pain     Allergic     Took shots as a child    Anesthesia complication     HYPOTENSION    Arthritis     Borderline anemia     Colon polyp 2018    Colonoscopy as recommended    Gallbladder & bile duct stone with obstruction     History of medical problems     " Mom had osteoporosis    Intramural leiomyoma of uterus     Pancreatitis 2025    From gall bladder incident    PONV (postoperative nausea and vomiting)     Rosacea     Urinary tract infection     Issues since 2025 surgery     No current outpatient medications on file.     No current facility-administered medications for this visit.     Past Surgical History:   Procedure Laterality Date    BREAST BIOPSY Left      SECTION      X1    COLONOSCOPY      Revisit in 5 years    D & C HYSTEROSCOPY ENDOMETRIAL ABLATION N/A 10/18/2016    Procedure: DILATATION AND CURETTAGE HYSTEROSCOPY NOVASURE ENDOMETRIAL ABLATION, cervical polypectomy;  Surgeon: Chiara Rod MD;  Location: [facility];  Service:     ENDOMETRIAL ABLATION      JOINT REPLACEMENT  2016    LAPAROSCOPIC CHOLECYSTECTOMY  2025    TOTAL HIP ARTHROPLASTY Right 2016    Procedure: RT TOTAL HIP ARTHROPLASTY;  Surgeon: Gilmar Nunn MD;  Location: [facility] OR;  Service:     TOTAL LAPAROSCOPIC HYSTERECTOMY N/A 2020    Procedure: TOTAL LAPAROSCOPIC HYSTERECTOMY BILATERAL SALPINGECTOMY VULVAR LESION BIOPSY;  Surgeon: Chiara Rod MD;  Location: [facility] OR;  Service: Gynecology    WISDOM TOOTH EXTRACTION        Health Maintenance Due   Topic Date Due    COLORECTAL CANCER SCREENING  Never done    HEPATITIS C SCREENING  Never done      Immunization History   Administered Date(s) Administered    Tdap 2017         Part of this note may be an electronic transcription/translation of spoken language to printed   text using the Dragon Dictation System.      BEAU Winslow

## 2025-07-18 LAB
THYROGLOB AB SERPL-ACNC: <1 IU/ML (ref 0–0.9)
THYROPEROXIDASE AB SERPL-ACNC: 12 IU/ML (ref 0–34)

## 2025-07-22 LAB — T3REVERSE SERPL-MCNC: 18.4 NG/DL (ref 9.2–24.1)

## 2025-08-05 ENCOUNTER — PATIENT MESSAGE (OUTPATIENT)
Dept: FAMILY MEDICINE CLINIC | Age: 60
End: 2025-08-05
Payer: OTHER GOVERNMENT

## 2025-08-05 DIAGNOSIS — N39.0 URINARY TRACT INFECTION WITHOUT HEMATURIA, SITE UNSPECIFIED: Primary | ICD-10-CM

## 2025-08-07 RX ORDER — NITROFURANTOIN 25; 75 MG/1; MG/1
100 CAPSULE ORAL 2 TIMES DAILY
Qty: 10 CAPSULE | Refills: 0 | Status: SHIPPED | OUTPATIENT
Start: 2025-08-07 | End: 2025-08-12

## (undated) DEVICE — NDL BIOP CHIBA 22G 8IN

## (undated) DEVICE — GLV SURG TRIUMPH CLASSIC PF LTX 6.5 STRL

## (undated) DEVICE — LAPAROSCOPIC SMOKE ELIMINATION DEVICE: Brand: PNEUVIEW XE

## (undated) DEVICE — HARMONIC ACE +7 LAPAROSCOPIC SHEARS ADVANCED HEMOSTASIS 5MM DIAMETER 36CM SHAFT LENGTH  FOR USE WITH GRAY HAND PIECE ONLY: Brand: HARMONIC ACE

## (undated) DEVICE — 3M™ STERI-STRIP™ COMPOUND BENZOIN TINCTURE 40 BAGS/CARTON 4 CARTONS/CASE C1544: Brand: 3M™ STERI-STRIP™

## (undated) DEVICE — KTTNER ENDO BLNT DISSCT

## (undated) DEVICE — LOU GYN LAPAROSCOPY: Brand: MEDLINE INDUSTRIES, INC.

## (undated) DEVICE — ENDOCUT SCISSOR TIP, DISPOSABLE: Brand: RENEW

## (undated) DEVICE — MEDI-VAC YANKAUER SUCTION HANDLE W/BULBOUS TIP: Brand: CARDINAL HEALTH

## (undated) DEVICE — ENDOPATH XCEL UNIVERSAL TROCAR STABLILITY SLEEVES: Brand: ENDOPATH XCEL

## (undated) DEVICE — DEV SUT GRSPR CLOSUR 15CM 14G

## (undated) DEVICE — DEV COND GAS LAP INSUFLOW W/LUER CONN

## (undated) DEVICE — GAUZE,SPONGE,4"X4",16PLY,XRAY,STRL,LF: Brand: MEDLINE

## (undated) DEVICE — IRRIGATOR BULB ASEPTO 60CC STRL

## (undated) DEVICE — 1 ML TUBERCULIN SYRINGE REGULAR TIP: Brand: MONOJECT

## (undated) DEVICE — SUT VIC 0 TIES 18IN J912G

## (undated) DEVICE — TOWEL,OR,DSP,ST,BLUE,STD,4/PK,20PK/CS: Brand: MEDLINE

## (undated) DEVICE — ENDOPATH XCEL BLADELESS TROCARS WITH STABILITY SLEEVES: Brand: ENDOPATH XCEL

## (undated) DEVICE — 2, DISPOSABLE SUCTION/IRRIGATOR WITH DISPOSABLE TIP: Brand: STRYKEFLOW

## (undated) DEVICE — 3M™ STERI-STRIP™ REINFORCED ADHESIVE SKIN CLOSURES, R1546, 1/4 IN X 4 IN (6 MM X 100 MM), 10 STRIPS/ENVELOPE: Brand: 3M™ STERI-STRIP™

## (undated) DEVICE — NDL SPINE 22G 31/2IN BLK

## (undated) DEVICE — SYR LUERLOK 20CC BX/50

## (undated) DEVICE — GLV SURG BIOGEL LTX PF 6

## (undated) DEVICE — SOL NACL 0.9PCT 1000ML

## (undated) DEVICE — SUT VIC 0 CT1 36IN J946H

## (undated) DEVICE — TUBING, SUCTION, 1/4" X 20', STRAIGHT: Brand: MEDLINE INDUSTRIES, INC.

## (undated) DEVICE — DISSCT ENDO

## (undated) DEVICE — SUT MNCRYL PLS ANTIB UD 4/0 PS2 18IN

## (undated) DEVICE — PENCL E/S HNDSWCH ROCKR CB